# Patient Record
Sex: FEMALE | Race: WHITE | NOT HISPANIC OR LATINO | Employment: OTHER
[De-identification: names, ages, dates, MRNs, and addresses within clinical notes are randomized per-mention and may not be internally consistent; named-entity substitution may affect disease eponyms.]

---

## 2021-03-31 DIAGNOSIS — Z23 ENCOUNTER FOR IMMUNIZATION: ICD-10-CM

## 2023-05-24 ENCOUNTER — TRANSCRIBE ORDERS (OUTPATIENT)
Dept: SCHEDULING | Age: 68
End: 2023-05-24

## 2023-05-24 DIAGNOSIS — R39.15 URGENCY OF URINATION: Primary | ICD-10-CM

## 2023-08-02 ENCOUNTER — HOSPITAL ENCOUNTER (OUTPATIENT)
Dept: PHYSICAL THERAPY | Age: 68
Setting detail: THERAPIES SERIES
Discharge: HOME | End: 2023-08-02
Attending: NURSE PRACTITIONER
Payer: MEDICARE

## 2023-08-02 DIAGNOSIS — R39.15 URGENCY OF URINATION: Primary | ICD-10-CM

## 2023-08-02 PROCEDURE — 97530 THERAPEUTIC ACTIVITIES: CPT | Mod: GP

## 2023-08-02 RX ORDER — ROSUVASTATIN CALCIUM 5 MG/1
5 TABLET, COATED ORAL DAILY
COMMUNITY

## 2023-08-02 RX ORDER — LISINOPRIL AND HYDROCHLOROTHIAZIDE 10; 12.5 MG/1; MG/1
1 TABLET ORAL DAILY
COMMUNITY

## 2023-08-02 NOTE — LETTER
[address]  [facility] [place] [address]  [facility] Fax: [phone]    PHYSICAL THERAPY PLAN OF CARE    Patient Name: Mary Roque    Certification Dates:  From 23  To: 10/31/23  Frequency: 1 time/week Duration: 3 months  Other:      Provider: Ruba Morejon, PT     Referring Provider: Molly Talbot CRNP  PCP: Kati Wright MD        Payor: Payor: MEDICARE / Plan: MEDICARE PART A & B / Product Type: Medicare /   Medical Diagnosis: Urgency of urination [R39.15]     Rehab Potential: good, to achieve stated therapy goals    Planned Services: The patient's treatment will include CPT 16391 Gait training, CPT 63438 Manual therapy, CPT 89544 Neuromuscular Reeducation, CPT 97253 Therapeutic activities, CPT 57096 Therapeutic exercises, CPT 30027 Electrical stimulation ATTENDED, CPT 73771 Electrical stimulation UNATTENDED, CPT 59072 Hot/Cold Packs therapy, .     By signing this plan of care, I certify this plan of care as correct and necessary for the patient.        Physician Signature: _________________________________________ Date: _________________    Thank you for this referral. Please contact our department with any questions.      Ruba Morejon, GINETTE      Physical Therapy Evaluation    [facility] Fax: [phone]    PT EVALUATION FOR OUTPATIENT THERAPY    Patient: Mary Roque    MRN: [id number]  : 1955 68 y.o.     Referring Physician: Molly Talbot CRNP  Date of Visit: 2023      Certification Dates:  23 through 10/31/23         Recommended Frequency & Duration:  1 time/week for up to 3 months     Diagnosis:   1. Urgency of urination        Chief Complaints:   Chief Complaint   Patient presents with   • Pain   • Other     Bowel and bladder function       Precautions: no known precautions/restrictions  Precautions additional comments:      Past Medical History:   Past Medical History:   Diagnosis Date   • Hypertension    •  Lipid disorder        Past Surgical History: History reviewed. No pertinent surgical history.      LEARNING ASSESSMENT    Assessment completed:  Yes    Learner name:  Mary    Learner: Patient    Learning Barriers:  Learning barriers: No Barriers    Preferred Language: English     Needed: No    Education Provided:   Method: Discussion and Handout  Readiness: acceptance  Response: Needs reinforcement and Verbalizes understanding      CO-LEARNER ASSESSMENT:    Completed: No          Welcome letter discussed: Yes Patient provided with Welcome Letter, which includes attendance policy. Provided education regarding cancellation and no-show policy. Education regarding the importance of participation and regular attendance to maximize goal attainment.         OBJECTIVE MEASUREMENTS/DATA:    Time In Session:  Start Time: 0904  Stop Time: 1000  Time Calculation (min): 56 min   Assessment and Plan - 08/02/23 0906        Assessment    Plan of Care reviewed and patient/family in agreement Yes     Functional Limitations in Following Categories (PT Eval) self-care;home management;community/leisure     Rehab Potential/Prognosis good, to achieve stated therapy goals     Clinical Assessment Pt is a 68 y.o. F with complaints of bladder and bowel urgency. Extensive discussion on food and fluid choices to minimize strong urgency complaints. Education on small diet adjustments, and set initial goals for bowel health management. Pt instructed on deep breathing to relax PFM and fight/flight response with urgency, will build on this at next session. WIll complete pelvic eval at next session for greater understanding of symptoms.     Planned Services CPT 74637 Gait training;CPT 32192 Manual therapy;CPT 58513 Neuromuscular Reeducation;CPT 38439 Therapeutic activities;CPT 05902 Therapeutic exercises;CPT 83394 Electrical stimulation ATTENDED;CPT 67849 Electrical stimulation UNATTENDED;CPT 15070 Hot/Cold Packs therapy                 General Information - 08/02/23 0906        Session Details    Document Type initial evaluation     Mode of Treatment individual therapy        General Information    Referring Physician LATANYA Agee     Patient/Family/Caregiver Comments/Observations MedHx: 4 vaginal births, with tearing and x1 episiotomy     Existing Precautions/Restrictions no known precautions/restrictions                Pain/Vitals - 08/02/23 0906        Pain Assessment    Currently in pain No/Denies                Falls/Food Screening - 08/02/23 0906        Initial Falls Assessment    One or more falls in the last year Yes     How many times 1     Was the patient injured in any fall No   while hiking in the woods       Food Insecurity    Within the past 12 months, you worried that your food would run out before you got the money to buy more. Never true     Within the past 12 months, the food you bought just didn't last and you didn't have money to get more. Never true                PT - 08/02/23 0906        Physical Therapy    Physical Therapy Specialty Pelvic Floor Program: Age 14+        PT Plan    Frequency of treatment 1 time/week     PT Duration 3 months     PT Cert From 08/02/23     PT Cert To 10/31/23     Date PT POC was sent to provider 08/02/23     Signed PT Plan of Care received?  No                   Outcome Measures    PT Outcome Measures - 08/02/23 0906        Other Outcome Measures Used/Comments    Other outcome measure used: PFDI 50                  Goals        Patient Stated    •  <enter goal here> (pt-stated)        Other    •  Mutually agreed upon pain goal       Mutually agreed upon pain goal: n/a    PATIENT STATED: REDUCE BOWEL AND BLADDER URGENCY      •  Pelvic PT Goals       Pt will be I with initial HEP instruction to improve pt's bowel function and PFM tension: 3 wks    Pt will be I with x3 complete bowel emptying behavioral strategies for improving bowel health: 4 wks     Pt will successfully demonstrate  fecal urgency suppression strategies for 10 min, 90% of the time, for improved bowel health: 8 wks     Pt will be I with diaphragmatic breathing for reducing tension, stress, pelvic pain complaints: 4 wks    Pt will demonstrate good coordination (contract, relax, drop) of PFM, evident per eval, for improving coordinated defecation: 12 wks     Pt will report no UUI complaints greater than 1x every 2 wks, for good bowel health: 12 wks      Pt will report bladder freq WNL, day and night: 12 wks    Pt will be I with progression of PFM HEP instruction to maintain/improve bowel and pelvic health: 12 wks    PFDI score will improve to below 35, indicating improvements in bladder/bowel function and QOL (50 on IE): 12 wks               TREATMENT PLAN:    Pt is a 68 y.o. F with complaint of bowel urgency over the last couple years. Stopped drinking milk and eating less dairy but did not seem to help. In the past, pt was provided with slow and fast PFM contraction exercises that may have somewhat helped her bowel urgency, but then thought that more kegels may have stimulated more bladder problems.     Bladder: Comment:            Urination frequency About 7+ x/day   Urgency:  Bladder urgency may be related to need for a BM    Can have a strong bladder urge daily   Incontinence UUI not daily, a couple drops  Denies ZULLY   Pads 1 liner, maybe 2 with a leak   Nocturia 2x/night   Pain denies   Emptying WNL   Prolapse symptoms  Denies symptoms   Liquid consumption 1 mug coffee  Water WNL   UTI history 2x in last 2 years   Irritants          Bowel: Comment:        Frequency 3x/day   Urge Very strong moments of urgency daily   Incontinence Denies but worried about not making it in time   Emptying denies   [place] stool 4-6   Fiber    Management No red meat  No milk    Likes bread and bagels  2 servings fruits/veggies/day   Pain Denies  Hx of hemorrhoids    1 BM when awakes, 1 after coffee, 1-2 later in the day     OBGYN Comment:         Pregnancies 5   Births 4   Birth type vaginal   Tears/ episiotomies Episiotomy with 1st  Small tearing with others   Surgery denies   Menstruation menopause                   Sexual Activity Comment:    With partner   Type Minimally sexually active currently   Pain Denies pain or complaints with intimacy   Orgasm    Masturbation    Libido              Pain Comment:        denies                    Other Comment:        Physical activity  Hike 1x/wk  Walking more regularly   PLOF    Living environment    Other    Sleep              Systems Review:   Cord questions:  Pins and needles or tingling in both arms and both legs at the same time? denies  Problems with stumbling or falling? Denies     Cauda equina questions:  Problems with bowel and bladder control? Specifically retention? See above  Pins and needles or numbness in the saddle area? Denies     Review of systems:  General - (chills, night sweats, recent infection, fever, weight loss/gain, unexplained night pain, excessive fatigue): Denies  Do you have a history of cancer? Denies  Gastrointestinal system - (abdominal pain, bowel changes, nausea, vomiting, bloating): Denies  Cardiovascular system - (chest pain, palpitations, orthopnea, other): Denies  Respiratory system - (cough, SOB, sputum production, other): Denies  Musculoskeletal system: osteoporosis, vertebral fracture? Denies  Endocrine - (polyuria, polydipsia, heat or cold intolerance, other): Denies  Neurological - (numbness/tingling, falling/stumbling, HA, dizziness, diplopia, dysphagia/dysarthria, double vision, tinnitus, memory, drop attacks, other): Denies  Any long-term steroid use? Denies      Patient goals: reduce fecal urgency, reduce urinary urgency    OBJECTIVE FINDINGS:      ASSESSMENT   PELVIS/POSTURE            LUMBAR AROM    Flexion    Extension    rotation    Sidebending        HIP ROM    ER    IR    Flexion    Extension    Ankle ROM    Lower Extremity Strength    hip flexion    hip  extension    Hip IR    Hip ER    Hip abduction    Knee    Ankle            SPECIAL TESTS    Hamstring length    Hip FADIR    Hip SCOUR    Trendelenburg    Slump test    SLR test    CADE            LUMBAR PALPATION    HIP PALPATION    OTHER PALPATION        SI JOINT TESTING     S/L Compression test    Thigh thrust test    Prone sacral thrust test    Gaenslen test        BALANCE    GAIT MECHANICS    RUNNING MECHANICS    SQUATTING MECHANICS    LUNGING MECHANICS    BED MOBILITY    SIT TO STAND    FLOOR TO STAND      Verbal consent give for internal evaluation and treatment. yes    Pelvic Floor MMT and Function INITIAL EVALUATION 8/2/23 F/U ASSESSMENT   Consent to eval and treat yes    Assessment position Hooklying, draped with sheet     EXTERNAL PFM EXAM     PFM OBSERVATION     Skin integrity     Contract     Relax     Bulge/pelvic drop     Cough     Perineal descent     Vaginal wall laxity     Light touch sensation     INTERNAL Assessment performed Internal vaginal     Levator Ani PERF       Power *5     Endurance *seconds     Repetitions      Quick Flicks * x in 10 sec     Vaginal introitus     Nora-urethra     Levator palpation     Puborectalis      Obturator Internus      Piriformis      TAILBONE          ABDOMEN     MMT strength      Diastasis Recti      Pain      Breathing pattern      Hip flexors     Skin integumentary/incision lines           PFDI OUTCOME MEASURE 50 total; 8.3 POPDI, 12.5 CRAD, 29.2 SMOOTH     Adductors      Biofeedback                  TREATMENT LOG:  verbal consent for internal intervention: yes  Diagnosis  Bowel and bladder urgency    Precautions       PT INITIAL EVALUATION:   8/2/23     PT PROGRESS NOTE:        Y/N Treatment Details: Time:   MODALITIES  00003   0 mins   Heat       Ice       THER ACT          66154   20 mins   OUTCOME MEASURES Y PFDI    POC discussion y     Review of symptoms  Tests/Measures y     Falls Screen, Medication Review, VS, pain assessment Y      Education:  Y Pelvic  floor anatomy/ purpose function    *Pt verbalized understanding of education and returned demonstration appropriately*    Bladder Education y Healthy bladder norms  Bladder irritants  WNL fluid intake for bladder (pt likely WNL)  GOAL: 2-4 hours b/n bathroom trips    Bowel Education y Healthy bowel norms  Bowel irritants  Consistent meal times, consistent meal ingredients, night of rest and digest  GOAL: 1 serving fruit or veg with each meal for now  GOAL: delay for 15 min before BM when at home    Toilet Posture edu/demo/practice      Postural edu      HEP / HEP Review Y  eval: deep breathihg    THER EX         17096   0 mins   SUPINE THEREX:         SEATED THEREX:         STANDING THEREX:        STRETCHING:               NEURO RE-ED    80984   0 mins   Diaphragmatic breathing      Postural Re-Edu      Pelvic Coordination            Coordination with EMG Biofeedback  Pelvic floor muscle strengthening with [employer] biofeedback unit, external sensors placed at 9 and 3 o'clock of anal opening, grounding electrode placed on ischial tuberosity:   - SUPINE:   - SEATED:  - STANDING:      Biofeedback Objective Findings  SUPINE REST:  SUPINE WORK:  SEATED REST:  SEATED WORK:  STANDING REST:  STANDING WORK:    MANUAL                 21205   0 mins   Joint Mobilization       Deep Tissue mobilizations External      Deep Tissue Mobilization Internal      IASTM/MFR/TrP Release              PLAN:   - review all edu to date   - practice breathing   - abdomen and pelvic eval - check episiotomy            ASSESSMENT:    This 68 y.o. year old female presents to PT with above stated diagnosis. Physical Therapy evaluation reveals   resulting in self-care, home management, community/leisure limitations. Mary Roque will benefit from skilled PT services to address limitation, work towards rehab and patient goals and maximize PLOF of chosen ADLs.     Planned Services: The patient's treatment will include CPT 19256 Gait training,  CPT 34059 Manual therapy, CPT 59084 Neuromuscular Reeducation, CPT 59537 Therapeutic activities, CPT 92807 Therapeutic exercises, CPT 86343 Electrical stimulation ATTENDED, CPT 77129 Electrical stimulation UNATTENDED, CPT 32217 Hot/Cold Packs therapy, .     uRba Morejon, PT

## 2023-08-02 NOTE — Clinical Note
Dear DR. Talbot,    Thank you for this referral. Please review the attached notes and plan of care for your approval.  Please contact our department with any questions.     Sincerely,     Ruba Morejon, PT  [address]  [facility] [address]  Phone [phone]  Fax  [phone]    By co-signing this Plan of Care (POC) you agree to the following:  I have reviewed the the Plan of Care established by the therapist within this document and certify that the services are skilled and medically necessary. I have reviewed the plan and recommend that these services continue to meet the goals stated in this document.    PHYSICIAN SIGNATURE: __________________________________     DATE: ___________________  TIME: _____________           Physical Therapy Plan of Care 23   Effective from: 2023  Effective to: 10/31/2023    Plan ID: [id number]            Participants as of 2023    Name Type Comments Contact Info    LATANYA Costello Referring Provider  [phone]    Ruba Morejon, PT Physical Therapist         Last Progress Notes Note     Author: Ruba Morejon PT Status: Signed Last edited: 2023  8:00 AM       Physical Therapy Visit    PT DAILY NOTE FOR OUTPATIENT THERAPY    Patient: Mary Roque MRN: [id number]  : 1955 68 y.o.  Referring Physician: Molly Talbot CRNP  Date of Visit: 2023    Certification Dates: 23 through 10/31/23    Diagnosis:   1. Urgency of urination          Precautions:   Existing Precautions/Restrictions: no known precautions/restrictions      TODAY'S VISIT    Time In Session:  Start Time: 0800  Stop Time: 0858  Time Calculation (min): 58 min   History/Vitals/Pain/Encounter Info - 23 0801        Injury History/Precautions/Daily Required Info    Document Type daily treatment     Referring Physician LATANYA Agee     Existing Precautions/Restrictions no known precautions/restrictions      Patient/Family/Caregiver Comments/Observations Had a pain after a hike one time recently - heaviness, pressure, and pain. Also had this pelvic pain another instance. Does not occur regularly, a couple times in the past 6 months. Pt also realizes that she probably has closer to 5-6 BMs/day. Stool type can typically be 3-5 range, sometimes 1 and sometimes 6. Gets a bladder urge to void and may have a BM, too.     Patient reported fall since last visit No        Pain Assessment    Currently in pain No/Denies                Daily Treatment Assessment and Plan - 08/11/23 0801        Daily Treatment Assessment and Plan    Progress toward goals Progressing     Daily Outcome Summary Pelvic eval and focus on coordination of PFM with breathing. Pt able to demonstrate these skills with min cuing. Will progress PFM strength and control for fecal urgency suppression next session. Pt able to demo initial HEP for strengthening today. Also educated pt on toilet seat positioning and how to relax to have complete BM.                     OBJECTIVE DATA TAKEN TODAY:        Today's Treatment:    Pt is a 68 y.o. F with complaint of bowel urgency over the last couple years. Stopped drinking milk and eating less dairy but did not seem to help. In the past, pt was provided with slow and fast PFM contraction exercises that may have somewhat helped her bowel urgency, but then thought that more kegels may have stimulated more bladder problems.     Bladder: Comment:            Urination frequency About 7+ x/day   Urgency:  Bladder urgency may be related to need for a BM    Can have a strong bladder urge daily   Incontinence UUI not daily, a couple drops  Denies ZULLY   Pads 1 liner, maybe 2 with a leak   Nocturia 2x/night   Pain denies   Emptying WNL   Prolapse symptoms  Denies symptoms   Liquid consumption 1 mug coffee  Water WNL   UTI history 2x in last 2 years   Irritants          Bowel: Comment:        Frequency 3x/day   Urge Very strong  moments of urgency daily   Incontinence Denies but worried about not making it in time   Emptying denies   [place] stool 4-6   Fiber    Management No red meat  No milk    Likes bread and bagels  2 servings fruits/veggies/day   Pain Denies  Hx of hemorrhoids    1 BM when awakes, 1 after coffee, 1-2 later in the day     OBGYN Comment:        Pregnancies 5   Births 4   Birth type vaginal   Tears/ episiotomies Episiotomy with 1st  Small tearing with others   Surgery denies   Menstruation menopause                   Sexual Activity Comment:    With partner   Type Minimally sexually active currently   Pain Denies pain or complaints with intimacy   Orgasm    Masturbation    Libido              Pain Comment:        denies                    Other Comment:        Physical activity  Hike 1x/wk  Walking more regularly   PLOF    Living environment    Other    Sleep              Systems Review:   Cord questions:  Pins and needles or tingling in both arms and both legs at the same time? denies  Problems with stumbling or falling? Denies     Cauda equina questions:  Problems with bowel and bladder control? Specifically retention? See above  Pins and needles or numbness in the saddle area? Denies     Review of systems:  General - (chills, night sweats, recent infection, fever, weight loss/gain, unexplained night pain, excessive fatigue): Denies  Do you have a history of cancer? Denies  Gastrointestinal system - (abdominal pain, bowel changes, nausea, vomiting, bloating): Denies  Cardiovascular system - (chest pain, palpitations, orthopnea, other): Denies  Respiratory system - (cough, SOB, sputum production, other): Denies  Musculoskeletal system: osteoporosis, vertebral fracture? Denies  Endocrine - (polyuria, polydipsia, heat or cold intolerance, other): Denies  Neurological - (numbness/tingling, falling/stumbling, HA, dizziness, diplopia, dysphagia/dysarthria, double vision, tinnitus, memory, drop attacks, other): Denies  Any  long-term steroid use? Denies      Patient goals: reduce fecal urgency, reduce urinary urgency    OBJECTIVE FINDINGS:      ASSESSMENT   PELVIS/POSTURE            LUMBAR AROM    Flexion    Extension    rotation    Sidebending        HIP ROM    ER    IR    Flexion    Extension    Ankle ROM    Lower Extremity Strength    hip flexion    hip extension    Hip IR    Hip ER    Hip abduction    Knee    Ankle            SPECIAL TESTS    Hamstring length    Hip FADIR    Hip SCOUR    Trendelenburg    Slump test    SLR test    CADE            LUMBAR PALPATION    HIP PALPATION    OTHER PALPATION        SI JOINT TESTING     S/L Compression test    Thigh thrust test    Prone sacral thrust test    Gaenslen test        BALANCE    GAIT MECHANICS    RUNNING MECHANICS    SQUATTING MECHANICS    LUNGING MECHANICS    BED MOBILITY    SIT TO STAND    FLOOR TO STAND      Verbal consent give for internal evaluation and treatment. yes    Pelvic Floor MMT and Function INITIAL EVALUATION 8/2/23 F/U ASSESSMENT   Consent to eval and treat yes    Assessment position Hooklying, draped with sheet     EXTERNAL PFM EXAM No TTP B  Min-mod tension B    PFM OBSERVATION     Skin integrity Atrophy  Minor presence of labia minora  Skin redness  Large skin tag anal opening    Contract Present, able to minimize gluteal contraction    Relax present    Bulge/pelvic drop present    Cough absent    Perineal descent none    Vaginal wall laxity Anterior, not to level of hymen    Light touch sensation intact    INTERNAL Assessment performed Internal vaginal     Levator Ani PERF       Power 3/5     Endurance 3 seconds     Repetitions 5     Quick Flicks * x in 10 sec     Vaginal introitus WNL    Nora-urethra No TTP endopelvic fascia  L>R presence of contract at layer 2 muscles    Levator palpation Mod tension B  Able to relax with inhale  No TTP B    Puborectalis      Obturator Internus Neg TTP     Piriformis      TAILBONE          ABDOMEN     MMT strength       Diastasis Recti None present     Pain None present  No LLQ fullness present  No bladder urgency     Breathing pattern WNL     Hip flexors No TTP    Skin integumentary/incision lines WNL          PFDI OUTCOME MEASURE 50 total; 8.3 POPDI, 12.5 CRAD, 29.2 SMOOTH     Adductors      Biofeedback                  TREATMENT LOG:  verbal consent for internal intervention: yes  Diagnosis  Bowel and bladder urgency    Precautions       PT INITIAL EVALUATION:   8/2/23     PT PROGRESS NOTE:        Y/N Treatment Details: Time:   MODALITIES  61153   0 mins   Heat       Ice       THER ACT          21548   30 mins   OUTCOME MEASURES  PFDI    POC discussion y     Review of symptoms  Tests/Measures y     Falls Screen, Medication Review, VS, pain assessment Y      Education:  Y    y    y  y Pelvic floor anatomy/ purpose function    POP precautions: avoid pushing/straining; bend from knees/hips; no holding breath  Hemorrhoid edu  Vaginal atrophy edu: use of vaginal estrogen or coconut oil prn    *Pt verbalized understanding of education and returned demonstration appropriately*    Bladder Education  Healthy bladder norms  Bladder irritants  WNL fluid intake for bladder (pt likely WNL)  GOAL: 2-4 hours b/n bathroom trips    Bowel Education               y Healthy bowel norms  Bowel irritants  Consistent meal times, consistent meal ingredients, night of rest and digest  GOAL: 1 serving fruit or veg with each meal for now  GOAL: delay for 15 min before BM when at home    No greater than 15 min on the toilet; add deep breathing to relax    Toilet Posture edu/demo/practice y Toilet seat positioning, edu and rationale    Postural edu      HEP / HEP Review Y eval: deep breathing  8/11/23: toilet seat positioning; happy baby str, adductor str; 20x5 sec coordination PFMC with exhale    THER EX         49071   0 mins   SUPINE THEREX:         SEATED THEREX:         STANDING THEREX:        STRETCHING:               NEURO RE-ED    02111   25 mins    Diaphragmatic breathing y  y Practice in adductor butterfly  Practice in happy baby - grabbing feet    Postural Re-Edu      Pelvic Coordination y Pelvic eval: edu on findings   - pelvic coordination practice:      - inhale and relax practice      - exhale and contract x8          Coordination with EMG Biofeedback  Pelvic floor muscle strengthening with Prometheus biofeedback unit, external sensors placed at 9 and 3 o'clock of anal opening, grounding electrode placed on ischial tuberosity:   - SUPINE:   - SEATED:  - STANDING:      Biofeedback Objective Findings  SUPINE REST:  SUPINE WORK:  SEATED REST:  SEATED WORK:  STANDING REST:  STANDING WORK:    MANUAL                 99247   0 mins   Joint Mobilization       Deep Tissue mobilizations External      Deep Tissue Mobilization Internal      IASTM/MFR/TrP Release              PLAN:   - biofeedback next session   - review all edu to date   - practice breathing   - abdomen and pelvic eval - check episiotomy                                      Current Participants as of 8/11/2023    Name Type Comments Contact Info    LATANYA Costello Referring Provider  [phone]    Signature pending    Ruba Morejon PT Physical Therapist      Signature pending

## 2023-08-02 NOTE — PROGRESS NOTES
Physical Therapy Evaluation    [facility] Fax: [phone]    PT EVALUATION FOR OUTPATIENT THERAPY    Patient: Mary Roque    MRN: [id number]  : 1955 68 y.o.     Referring Physician: Molly Talbot CRNP  Date of Visit: 2023      Certification Dates:  23 through 10/31/23         Recommended Frequency & Duration:  1 time/week for up to 3 months     Diagnosis:   1. Urgency of urination        Chief Complaints:   Chief Complaint   Patient presents with   • Pain   • Other     Bowel and bladder function       Precautions: no known precautions/restrictions  Precautions additional comments:      Past Medical History:   Past Medical History:   Diagnosis Date   • Hypertension    • Lipid disorder        Past Surgical History: History reviewed. No pertinent surgical history.      LEARNING ASSESSMENT    Assessment completed:  Yes    Learner name:  Mary    Learner: Patient    Learning Barriers:  Learning barriers: No Barriers    Preferred Language: English     Needed: No    Education Provided:   Method: Discussion and Handout  Readiness: acceptance  Response: Needs reinforcement and Verbalizes understanding      CO-LEARNER ASSESSMENT:    Completed: No          Welcome letter discussed: Yes Patient provided with Welcome Letter, which includes attendance policy. Provided education regarding cancellation and no-show policy. Education regarding the importance of participation and regular attendance to maximize goal attainment.         OBJECTIVE MEASUREMENTS/DATA:    Time In Session:  Start Time: 904  Stop Time: 1000  Time Calculation (min): 56 min   Assessment and Plan - 23 0906        Assessment    Plan of Care reviewed and patient/family in agreement Yes     Functional Limitations in Following Categories (PT Eval) self-care;home management;community/leisure     Rehab Potential/Prognosis good, to achieve stated therapy goals     Clinical Assessment Pt is a 68 y.o. F with  complaints of bladder and bowel urgency. Extensive discussion on food and fluid choices to minimize strong urgency complaints. Education on small diet adjustments, and set initial goals for bowel health management. Pt instructed on deep breathing to relax PFM and fight/flight response with urgency, will build on this at next session. WIll complete pelvic eval at next session for greater understanding of symptoms.     Planned Services CPT 68922 Gait training;CPT 58728 Manual therapy;CPT 96181 Neuromuscular Reeducation;CPT 20049 Therapeutic activities;CPT 36036 Therapeutic exercises;CPT 45103 Electrical stimulation ATTENDED;CPT 33423 Electrical stimulation UNATTENDED;CPT 81323 Hot/Cold Packs therapy                General Information - 08/02/23 0906        Session Details    Document Type initial evaluation     Mode of Treatment individual therapy        General Information    Referring Physician LATANYA Agee     Patient/Family/Caregiver Comments/Observations MedHx: 4 vaginal births, with tearing and x1 episiotomy     Existing Precautions/Restrictions no known precautions/restrictions                Pain/Vitals - 08/02/23 0906        Pain Assessment    Currently in pain No/Denies                Falls/Food Screening - 08/02/23 0906        Initial Falls Assessment    One or more falls in the last year Yes     How many times 1     Was the patient injured in any fall No   while hiking in the woods       Food Insecurity    Within the past 12 months, you worried that your food would run out before you got the money to buy more. Never true     Within the past 12 months, the food you bought just didn't last and you didn't have money to get more. Never true                PT - 08/02/23 0906        Physical Therapy    Physical Therapy Specialty Pelvic Floor Program: Age 14+        PT Plan    Frequency of treatment 1 time/week     PT Duration 3 months     PT Cert From 08/02/23     PT Cert To 10/31/23     Date PT POC was  sent to provider 08/02/23     Signed PT Plan of Care received?  No                   Outcome Measures    PT Outcome Measures - 08/02/23 0906        Other Outcome Measures Used/Comments    Other outcome measure used: PFDI 50                  Goals        Patient Stated    •  <enter goal here> (pt-stated)        Other    •  Mutually agreed upon pain goal       Mutually agreed upon pain goal: n/a    PATIENT STATED: REDUCE BOWEL AND BLADDER URGENCY      •  Pelvic PT Goals       Pt will be I with initial HEP instruction to improve pt's bowel function and PFM tension: 3 wks    Pt will be I with x3 complete bowel emptying behavioral strategies for improving bowel health: 4 wks     Pt will successfully demonstrate fecal urgency suppression strategies for 10 min, 90% of the time, for improved bowel health: 8 wks     Pt will be I with diaphragmatic breathing for reducing tension, stress, pelvic pain complaints: 4 wks    Pt will demonstrate good coordination (contract, relax, drop) of PFM, evident per eval, for improving coordinated defecation: 12 wks     Pt will report no UUI complaints greater than 1x every 2 wks, for good bowel health: 12 wks      Pt will report bladder freq WNL, day and night: 12 wks    Pt will be I with progression of PFM HEP instruction to maintain/improve bowel and pelvic health: 12 wks    PFDI score will improve to below 35, indicating improvements in bladder/bowel function and QOL (50 on IE): 12 wks               TREATMENT PLAN:    Pt is a 68 y.o. F with complaint of bowel urgency over the last couple years. Stopped drinking milk and eating less dairy but did not seem to help. In the past, pt was provided with slow and fast PFM contraction exercises that may have somewhat helped her bowel urgency, but then thought that more kegels may have stimulated more bladder problems.     Bladder: Comment:            Urination frequency About 7+ x/day   Urgency:  Bladder urgency may be related to need for a  BM    Can have a strong bladder urge daily   Incontinence UUI not daily, a couple drops  Denies ZULLY   Pads 1 liner, maybe 2 with a leak   Nocturia 2x/night   Pain denies   Emptying WNL   Prolapse symptoms  Denies symptoms   Liquid consumption 1 mug coffee  Water WNL   UTI history 2x in last 2 years   Irritants          Bowel: Comment:        Frequency 3x/day   Urge Very strong moments of urgency daily   Incontinence Denies but worried about not making it in time   Emptying denies   [place] stool 4-6   Fiber    Management No red meat  No milk    Likes bread and bagels  2 servings fruits/veggies/day   Pain Denies  Hx of hemorrhoids    1 BM when awakes, 1 after coffee, 1-2 later in the day     OBGYN Comment:        Pregnancies 5   Births 4   Birth type vaginal   Tears/ episiotomies Episiotomy with 1st  Small tearing with others   Surgery denies   Menstruation menopause                   Sexual Activity Comment:    With partner   Type Minimally sexually active currently   Pain Denies pain or complaints with intimacy   Orgasm    Masturbation    Libido              Pain Comment:        denies                    Other Comment:        Physical activity  Hike 1x/wk  Walking more regularly   PLOF    Living environment    Other    Sleep              Systems Review:   Cord questions:  Pins and needles or tingling in both arms and both legs at the same time? denies  Problems with stumbling or falling? Denies     Cauda equina questions:  Problems with bowel and bladder control? Specifically retention? See above  Pins and needles or numbness in the saddle area? Denies     Review of systems:  General - (chills, night sweats, recent infection, fever, weight loss/gain, unexplained night pain, excessive fatigue): Denies  Do you have a history of cancer? Denies  Gastrointestinal system - (abdominal pain, bowel changes, nausea, vomiting, bloating): Denies  Cardiovascular system - (chest pain, palpitations, orthopnea, other):  Denies  Respiratory system - (cough, SOB, sputum production, other): Denies  Musculoskeletal system: osteoporosis, vertebral fracture? Denies  Endocrine - (polyuria, polydipsia, heat or cold intolerance, other): Denies  Neurological - (numbness/tingling, falling/stumbling, HA, dizziness, diplopia, dysphagia/dysarthria, double vision, tinnitus, memory, drop attacks, other): Denies  Any long-term steroid use? Denies      Patient goals: reduce fecal urgency, reduce urinary urgency    OBJECTIVE FINDINGS:      ASSESSMENT   PELVIS/POSTURE            LUMBAR AROM    Flexion    Extension    rotation    Sidebending        HIP ROM    ER    IR    Flexion    Extension    Ankle ROM    Lower Extremity Strength    hip flexion    hip extension    Hip IR    Hip ER    Hip abduction    Knee    Ankle            SPECIAL TESTS    Hamstring length    Hip FADIR    Hip SCOUR    Trendelenburg    Slump test    SLR test    CADE            LUMBAR PALPATION    HIP PALPATION    OTHER PALPATION        SI JOINT TESTING     S/L Compression test    Thigh thrust test    Prone sacral thrust test    Gaenslen test        BALANCE    GAIT MECHANICS    RUNNING MECHANICS    SQUATTING MECHANICS    LUNGING MECHANICS    BED MOBILITY    SIT TO STAND    FLOOR TO STAND      Verbal consent give for internal evaluation and treatment. yes    Pelvic Floor MMT and Function INITIAL EVALUATION 8/2/23 F/U ASSESSMENT   Consent to eval and treat yes    Assessment position Hooklying, draped with sheet     EXTERNAL PFM EXAM     PFM OBSERVATION     Skin integrity     Contract     Relax     Bulge/pelvic drop     Cough     Perineal descent     Vaginal wall laxity     Light touch sensation     INTERNAL Assessment performed Internal vaginal     Levator Ani PERF       Power *5     Endurance *seconds     Repetitions      Quick Flicks * x in 10 sec     Vaginal introitus     Nora-urethra     Levator palpation     Puborectalis      Obturator Internus      Piriformis      TAILBONE           ABDOMEN     MMT strength      Diastasis Recti      Pain      Breathing pattern      Hip flexors     Skin integumentary/incision lines           PFDI OUTCOME MEASURE 50 total; 8.3 POPDI, 12.5 CRAD, 29.2 SMOOTH     Adductors      Biofeedback                  TREATMENT LOG:  verbal consent for internal intervention: yes  Diagnosis  Bowel and bladder urgency    Precautions       PT INITIAL EVALUATION:   8/2/23     PT PROGRESS NOTE:        Y/N Treatment Details: Time:   MODALITIES  47767   0 mins   Heat       Ice       THER ACT          73913   20 mins   OUTCOME MEASURES Y PFDI    POC discussion y     Review of symptoms  Tests/Measures y     Falls Screen, Medication Review, VS, pain assessment Y      Education:  Y Pelvic floor anatomy/ purpose function    *Pt verbalized understanding of education and returned demonstration appropriately*    Bladder Education y Healthy bladder norms  Bladder irritants  WNL fluid intake for bladder (pt likely WNL)  GOAL: 2-4 hours b/n bathroom trips    Bowel Education y Healthy bowel norms  Bowel irritants  Consistent meal times, consistent meal ingredients, night of rest and digest  GOAL: 1 serving fruit or veg with each meal for now  GOAL: delay for 15 min before BM when at home    Toilet Posture edu/demo/practice      Postural edu      HEP / HEP Review Y  eval: deep breathihg    THER EX         74975   0 mins   SUPINE THEREX:         SEATED THEREX:         STANDING THEREX:        STRETCHING:               NEURO RE-ED    36688   0 mins   Diaphragmatic breathing      Postural Re-Edu      Pelvic Coordination            Coordination with EMG Biofeedback  Pelvic floor muscle strengthening with Prometheus biofeedback unit, external sensors placed at 9 and 3 o'clock of anal opening, grounding electrode placed on ischial tuberosity:   - SUPINE:   - SEATED:  - STANDING:      Biofeedback Objective Findings  SUPINE REST:  SUPINE WORK:  SEATED REST:  SEATED WORK:  STANDING REST:  STANDING WORK:     MANUAL                 95499   0 mins   Joint Mobilization       Deep Tissue mobilizations External      Deep Tissue Mobilization Internal      IASTM/MFR/TrP Release              PLAN:   - review all edu to date   - practice breathing   - abdomen and pelvic eval - check episiotomy            ASSESSMENT:    This 68 y.o. year old female presents to PT with above stated diagnosis. Physical Therapy evaluation reveals   resulting in self-care, home management, community/leisure limitations. Mary Roque will benefit from skilled PT services to address limitation, work towards rehab and patient goals and maximize PLOF of chosen ADLs.     Planned Services: The patient's treatment will include CPT 53686 Gait training, CPT 89575 Manual therapy, CPT 23609 Neuromuscular Reeducation, CPT 64598 Therapeutic activities, CPT 64580 Therapeutic exercises, CPT 08378 Electrical stimulation ATTENDED, CPT 24911 Electrical stimulation UNATTENDED, CPT 15691 Hot/Cold Packs therapy, .     Ruba Morejon, PT

## 2023-08-02 NOTE — OP PT TREATMENT LOG
Pt is a 68 y.o. F with complaint of bowel urgency over the last couple years. Stopped drinking milk and eating less dairy but did not seem to help. In the past, pt was provided with slow and fast PFM contraction exercises that may have somewhat helped her bowel urgency, but then thought that more kegels may have stimulated more bladder problems.     Bladder: Comment:            Urination frequency About 7+ x/day   Urgency:  Bladder urgency may be related to need for a BM    Can have a strong bladder urge daily   Incontinence UUI not daily, a couple drops  Denies ZULLY   Pads 1 liner, maybe 2 with a leak   Nocturia 2x/night   Pain denies   Emptying WNL   Prolapse symptoms  Denies symptoms   Liquid consumption 1 mug coffee  Water WNL   UTI history 2x in last 2 years   Irritants          Bowel: Comment:        Frequency 3x/day   Urge Very strong moments of urgency daily   Incontinence Denies but worried about not making it in time   Emptying denies   [place] stool 4-6   Fiber    Management No red meat  No milk    Likes bread and bagels  2 servings fruits/veggies/day   Pain Denies  Hx of hemorrhoids    1 BM when awakes, 1 after coffee, 1-2 later in the day     OBGYN Comment:        Pregnancies 5   Births 4   Birth type vaginal   Tears/ episiotomies Episiotomy with 1st  Small tearing with others   Surgery denies   Menstruation menopause                   Sexual Activity Comment:    With partner   Type Minimally sexually active currently   Pain Denies pain or complaints with intimacy   Orgasm    Masturbation    Libido              Pain Comment:        denies                    Other Comment:        Physical activity  Hike 1x/wk  Walking more regularly   PLOF    Living environment    Other    Sleep              Systems Review:   Cord questions:  Pins and needles or tingling in both arms and both legs at the same time? denies  Problems with stumbling or falling? Denies     Cauda equina questions:  Problems with bowel and  bladder control? Specifically retention? See above  Pins and needles or numbness in the saddle area? Denies     Review of systems:  General - (chills, night sweats, recent infection, fever, weight loss/gain, unexplained night pain, excessive fatigue): Denies  Do you have a history of cancer? Denies  Gastrointestinal system - (abdominal pain, bowel changes, nausea, vomiting, bloating): Denies  Cardiovascular system - (chest pain, palpitations, orthopnea, other): Denies  Respiratory system - (cough, SOB, sputum production, other): Denies  Musculoskeletal system: osteoporosis, vertebral fracture? Denies  Endocrine - (polyuria, polydipsia, heat or cold intolerance, other): Denies  Neurological - (numbness/tingling, falling/stumbling, HA, dizziness, diplopia, dysphagia/dysarthria, double vision, tinnitus, memory, drop attacks, other): Denies  Any long-term steroid use? Denies      Patient goals: reduce fecal urgency, reduce urinary urgency    OBJECTIVE FINDINGS:      ASSESSMENT   PELVIS/POSTURE            LUMBAR AROM    Flexion    Extension    rotation    Sidebending        HIP ROM    ER    IR    Flexion    Extension    Ankle ROM    Lower Extremity Strength    hip flexion    hip extension    Hip IR    Hip ER    Hip abduction    Knee    Ankle            SPECIAL TESTS    Hamstring length    Hip FADIR    Hip SCOUR    Trendelenburg    Slump test    SLR test    CADE            LUMBAR PALPATION    HIP PALPATION    OTHER PALPATION        SI JOINT TESTING     S/L Compression test    Thigh thrust test    Prone sacral thrust test    Gaenslen test        BALANCE    GAIT MECHANICS    RUNNING MECHANICS    SQUATTING MECHANICS    LUNGING MECHANICS    BED MOBILITY    SIT TO STAND    FLOOR TO STAND      Verbal consent give for internal evaluation and treatment. yes    Pelvic Floor MMT and Function INITIAL EVALUATION 8/2/23 F/U ASSESSMENT   Consent to eval and treat yes    Assessment position Hooklying, draped with sheet     EXTERNAL PFM  EXAM     PFM OBSERVATION     Skin integrity     Contract     Relax     Bulge/pelvic drop     Cough     Perineal descent     Vaginal wall laxity     Light touch sensation     INTERNAL Assessment performed Internal vaginal     Levator Ani PERF       Power *5     Endurance *seconds     Repetitions      Quick Flicks * x in 10 sec     Vaginal introitus     Nora-urethra     Levator palpation     Puborectalis      Obturator Internus      Piriformis      TAILBONE          ABDOMEN     MMT strength      Diastasis Recti      Pain      Breathing pattern      Hip flexors     Skin integumentary/incision lines           PFDI OUTCOME MEASURE 50 total; 8.3 POPDI, 12.5 CRAD, 29.2 SMOOTH     Adductors      Biofeedback                  TREATMENT LOG:  verbal consent for internal intervention: yes  Diagnosis  Bowel and bladder urgency    Precautions       PT INITIAL EVALUATION:   8/2/23     PT PROGRESS NOTE:        Y/N Treatment Details: Time:   MODALITIES  10116   0 mins   Heat       Ice       THER ACT          70506   20 mins   OUTCOME MEASURES Y PFDI    POC discussion y     Review of symptoms  Tests/Measures y     Falls Screen, Medication Review, VS, pain assessment Y      Education:  Y Pelvic floor anatomy/ purpose function    *Pt verbalized understanding of education and returned demonstration appropriately*    Bladder Education y Healthy bladder norms  Bladder irritants  WNL fluid intake for bladder (pt likely WNL)  GOAL: 2-4 hours b/n bathroom trips    Bowel Education y Healthy bowel norms  Bowel irritants  Consistent meal times, consistent meal ingredients, night of rest and digest  GOAL: 1 serving fruit or veg with each meal for now  GOAL: delay for 15 min before BM when at home    Toilet Posture edu/demo/practice      Postural edu      HEP / HEP Review Y  eval: deep breathihg    THER EX         33150   0 mins   SUPINE THEREX:         SEATED THEREX:         STANDING THEREX:        STRETCHING:               NEURO RE-ED    06160   0  mins   Diaphragmatic breathing      Postural Re-Edu      Pelvic Coordination            Coordination with EMG Biofeedback  Pelvic floor muscle strengthening with [employer] biofeedback unit, external sensors placed at 9 and 3 o'clock of anal opening, grounding electrode placed on ischial tuberosity:   - SUPINE:   - SEATED:  - STANDING:      Biofeedback Objective Findings  SUPINE REST:  SUPINE WORK:  SEATED REST:  SEATED WORK:  STANDING REST:  STANDING WORK:    MANUAL                 50035   0 mins   Joint Mobilization       Deep Tissue mobilizations External      Deep Tissue Mobilization Internal      IASTM/MFR/TrP Release              PLAN:   - review all edu to date   - practice breathing   - abdomen and pelvic eval - check episiotomy

## 2023-08-11 ENCOUNTER — HOSPITAL ENCOUNTER (OUTPATIENT)
Dept: PHYSICAL THERAPY | Age: 68
Setting detail: THERAPIES SERIES
Discharge: HOME | End: 2023-08-11
Attending: NURSE PRACTITIONER
Payer: MEDICARE

## 2023-08-11 DIAGNOSIS — R39.15 URGENCY OF URINATION: Primary | ICD-10-CM

## 2023-08-11 PROCEDURE — 97530 THERAPEUTIC ACTIVITIES: CPT | Mod: GP

## 2023-08-11 PROCEDURE — 97112 NEUROMUSCULAR REEDUCATION: CPT | Mod: GP

## 2023-08-11 NOTE — OP PT TREATMENT LOG
Pt is a 68 y.o. F with complaint of bowel urgency over the last couple years. Stopped drinking milk and eating less dairy but did not seem to help. In the past, pt was provided with slow and fast PFM contraction exercises that may have somewhat helped her bowel urgency, but then thought that more kegels may have stimulated more bladder problems.     Bladder: Comment:            Urination frequency About 7+ x/day   Urgency:  Bladder urgency may be related to need for a BM    Can have a strong bladder urge daily   Incontinence UUI not daily, a couple drops  Denies ZULLY   Pads 1 liner, maybe 2 with a leak   Nocturia 2x/night   Pain denies   Emptying WNL   Prolapse symptoms  Denies symptoms   Liquid consumption 1 mug coffee  Water WNL   UTI history 2x in last 2 years   Irritants          Bowel: Comment:        Frequency 3x/day   Urge Very strong moments of urgency daily   Incontinence Denies but worried about not making it in time   Emptying denies   [place] stool 4-6   Fiber    Management No red meat  No milk    Likes bread and bagels  2 servings fruits/veggies/day   Pain Denies  Hx of hemorrhoids    1 BM when awakes, 1 after coffee, 1-2 later in the day     OBGYN Comment:        Pregnancies 5   Births 4   Birth type vaginal   Tears/ episiotomies Episiotomy with 1st  Small tearing with others   Surgery denies   Menstruation menopause                   Sexual Activity Comment:    With partner   Type Minimally sexually active currently   Pain Denies pain or complaints with intimacy   Orgasm    Masturbation    Libido              Pain Comment:        denies                    Other Comment:        Physical activity  Hike 1x/wk  Walking more regularly   PLOF    Living environment    Other    Sleep              Systems Review:   Cord questions:  Pins and needles or tingling in both arms and both legs at the same time? denies  Problems with stumbling or falling? Denies     Cauda equina questions:  Problems with bowel and  bladder control? Specifically retention? See above  Pins and needles or numbness in the saddle area? Denies     Review of systems:  General - (chills, night sweats, recent infection, fever, weight loss/gain, unexplained night pain, excessive fatigue): Denies  Do you have a history of cancer? Denies  Gastrointestinal system - (abdominal pain, bowel changes, nausea, vomiting, bloating): Denies  Cardiovascular system - (chest pain, palpitations, orthopnea, other): Denies  Respiratory system - (cough, SOB, sputum production, other): Denies  Musculoskeletal system: osteoporosis, vertebral fracture? Denies  Endocrine - (polyuria, polydipsia, heat or cold intolerance, other): Denies  Neurological - (numbness/tingling, falling/stumbling, HA, dizziness, diplopia, dysphagia/dysarthria, double vision, tinnitus, memory, drop attacks, other): Denies  Any long-term steroid use? Denies      Patient goals: reduce fecal urgency, reduce urinary urgency    OBJECTIVE FINDINGS:      ASSESSMENT   PELVIS/POSTURE            LUMBAR AROM    Flexion    Extension    rotation    Sidebending        HIP ROM    ER    IR    Flexion    Extension    Ankle ROM    Lower Extremity Strength    hip flexion    hip extension    Hip IR    Hip ER    Hip abduction    Knee    Ankle            SPECIAL TESTS    Hamstring length    Hip FADIR    Hip SCOUR    Trendelenburg    Slump test    SLR test    CADE            LUMBAR PALPATION    HIP PALPATION    OTHER PALPATION        SI JOINT TESTING     S/L Compression test    Thigh thrust test    Prone sacral thrust test    Gaenslen test        BALANCE    GAIT MECHANICS    RUNNING MECHANICS    SQUATTING MECHANICS    LUNGING MECHANICS    BED MOBILITY    SIT TO STAND    FLOOR TO STAND      Verbal consent give for internal evaluation and treatment. yes    Pelvic Floor MMT and Function INITIAL EVALUATION 8/2/23 F/U ASSESSMENT   Consent to eval and treat yes    Assessment position Hooklying, draped with sheet     EXTERNAL PFM  EXAM No TTP B  Min-mod tension B    PFM OBSERVATION     Skin integrity Atrophy  Minor presence of labia minora  Skin redness  Large skin tag anal opening    Contract Present, able to minimize gluteal contraction    Relax present    Bulge/pelvic drop present    Cough absent    Perineal descent none    Vaginal wall laxity Anterior, not to level of hymen    Light touch sensation intact    INTERNAL Assessment performed Internal vaginal     Levator Ani PERF       Power 3/5     Endurance 3 seconds     Repetitions 5     Quick Flicks * x in 10 sec     Vaginal introitus WNL    Nora-urethra No TTP endopelvic fascia  L>R presence of contract at layer 2 muscles    Levator palpation Mod tension B  Able to relax with inhale  No TTP B    Puborectalis      Obturator Internus Neg TTP     Piriformis      TAILBONE          ABDOMEN     MMT strength      Diastasis Recti None present     Pain None present  No LLQ fullness present  No bladder urgency     Breathing pattern WNL     Hip flexors No TTP    Skin integumentary/incision lines WNL          PFDI OUTCOME MEASURE 50 total; 8.3 POPDI, 12.5 CRAD, 29.2 SMOOTH     Adductors      Biofeedback                  TREATMENT LOG:  verbal consent for internal intervention: yes  Diagnosis  Bowel and bladder urgency    Precautions       PT INITIAL EVALUATION:   8/2/23     PT PROGRESS NOTE:        Y/N Treatment Details: Time:   MODALITIES  88151   0 mins   Heat       Ice       THER ACT          02466   30 mins   OUTCOME MEASURES  PFDI    POC discussion y     Review of symptoms  Tests/Measures y     Falls Screen, Medication Review, VS, pain assessment Y      Education:  Y    y    y  y Pelvic floor anatomy/ purpose function    POP precautions: avoid pushing/straining; bend from knees/hips; no holding breath  Hemorrhoid edu  Vaginal atrophy edu: use of vaginal estrogen or coconut oil prn    *Pt verbalized understanding of education and returned demonstration appropriately*    Bladder Education  Healthy  bladder norms  Bladder irritants  WNL fluid intake for bladder (pt likely WNL)  GOAL: 2-4 hours b/n bathroom trips    Bowel Education               y Healthy bowel norms  Bowel irritants  Consistent meal times, consistent meal ingredients, night of rest and digest  GOAL: 1 serving fruit or veg with each meal for now  GOAL: delay for 15 min before BM when at home    No greater than 15 min on the toilet; add deep breathing to relax    Toilet Posture edu/demo/practice y Toilet seat positioning, edu and rationale    Postural edu      HEP / HEP Review Y eval: deep breathing  8/11/23: toilet seat positioning; happy baby str, adductor str; 20x5 sec coordination PFMC with exhale    THER EX         88947   0 mins   SUPINE THEREX:         SEATED THEREX:         STANDING THEREX:        STRETCHING:               NEURO RE-ED    88347   25 mins   Diaphragmatic breathing y  y Practice in adductor butterfly  Practice in happy baby - grabbing feet    Postural Re-Edu      Pelvic Coordination y Pelvic eval: edu on findings   - pelvic coordination practice:      - inhale and relax practice      - exhale and contract x8          Coordination with EMG Biofeedback  Pelvic floor muscle strengthening with [employer] biofeedback unit, external sensors placed at 9 and 3 o'clock of anal opening, grounding electrode placed on ischial tuberosity:   - SUPINE:   - SEATED:  - STANDING:      Biofeedback Objective Findings  SUPINE REST:  SUPINE WORK:  SEATED REST:  SEATED WORK:  STANDING REST:  STANDING WORK:    MANUAL                 13077   0 mins   Joint Mobilization       Deep Tissue mobilizations External      Deep Tissue Mobilization Internal      IASTM/MFR/TrP Release              PLAN:   - biofeedback next session   - review all edu to date   - practice breathing   - abdomen and pelvic eval - check episiotomy

## 2023-08-11 NOTE — PROGRESS NOTES
Physical Therapy Visit    PT DAILY NOTE FOR OUTPATIENT THERAPY    Patient: Mary Roque MRN: [id number]  : 1955 68 y.o.  Referring Physician: Molly Talbot CRNP  Date of Visit: 2023    Certification Dates: 23 through 10/31/23    Diagnosis:   1. Urgency of urination          Precautions:   Existing Precautions/Restrictions: no known precautions/restrictions      TODAY'S VISIT    Time In Session:  Start Time: 0800  Stop Time: 0858  Time Calculation (min): 58 min   History/Vitals/Pain/Encounter Info - 23        Injury History/Precautions/Daily Required Info    Document Type daily treatment     Referring Physician LATANYA Agee     Existing Precautions/Restrictions no known precautions/restrictions     Patient/Family/Caregiver Comments/Observations Had a pain after a hike one time recently - heaviness, pressure, and pain. Also had this pelvic pain another instance. Does not occur regularly, a couple times in the past 6 months. Pt also realizes that she probably has closer to 5-6 BMs/day. Stool type can typically be 3-5 range, sometimes 1 and sometimes 6. Gets a bladder urge to void and may have a BM, too.     Patient reported fall since last visit No        Pain Assessment    Currently in pain No/Denies                Daily Treatment Assessment and Plan - 23        Daily Treatment Assessment and Plan    Progress toward goals Progressing     Daily Outcome Summary Pelvic eval and focus on coordination of PFM with breathing. Pt able to demonstrate these skills with min cuing. Will progress PFM strength and control for fecal urgency suppression next session. Pt able to demo initial HEP for strengthening today. Also educated pt on toilet seat positioning and how to relax to have complete BM.                     OBJECTIVE DATA TAKEN TODAY:        Today's Treatment:    Pt is a 68 y.o. F with complaint of bowel urgency over the last couple years. Stopped drinking milk  and eating less dairy but did not seem to help. In the past, pt was provided with slow and fast PFM contraction exercises that may have somewhat helped her bowel urgency, but then thought that more kegels may have stimulated more bladder problems.     Bladder: Comment:            Urination frequency About 7+ x/day   Urgency:  Bladder urgency may be related to need for a BM    Can have a strong bladder urge daily   Incontinence UUI not daily, a couple drops  Denies ZULLY   Pads 1 liner, maybe 2 with a leak   Nocturia 2x/night   Pain denies   Emptying WNL   Prolapse symptoms  Denies symptoms   Liquid consumption 1 mug coffee  Water WNL   UTI history 2x in last 2 years   Irritants          Bowel: Comment:        Frequency 3x/day   Urge Very strong moments of urgency daily   Incontinence Denies but worried about not making it in time   Emptying denies   [place] stool 4-6   Fiber    Management No red meat  No milk    Likes bread and bagels  2 servings fruits/veggies/day   Pain Denies  Hx of hemorrhoids    1 BM when awakes, 1 after coffee, 1-2 later in the day     OBGYN Comment:        Pregnancies 5   Births 4   Birth type vaginal   Tears/ episiotomies Episiotomy with 1st  Small tearing with others   Surgery denies   Menstruation menopause                   Sexual Activity Comment:    With partner   Type Minimally sexually active currently   Pain Denies pain or complaints with intimacy   Orgasm    Masturbation    Libido              Pain Comment:        denies                    Other Comment:        Physical activity  Hike 1x/wk  Walking more regularly   PLOF    Living environment    Other    Sleep              Systems Review:   Cord questions:  Pins and needles or tingling in both arms and both legs at the same time? denies  Problems with stumbling or falling? Denies     Cauda equina questions:  Problems with bowel and bladder control? Specifically retention? See above  Pins and needles or numbness in the saddle area?  Denies     Review of systems:  General - (chills, night sweats, recent infection, fever, weight loss/gain, unexplained night pain, excessive fatigue): Denies  Do you have a history of cancer? Denies  Gastrointestinal system - (abdominal pain, bowel changes, nausea, vomiting, bloating): Denies  Cardiovascular system - (chest pain, palpitations, orthopnea, other): Denies  Respiratory system - (cough, SOB, sputum production, other): Denies  Musculoskeletal system: osteoporosis, vertebral fracture? Denies  Endocrine - (polyuria, polydipsia, heat or cold intolerance, other): Denies  Neurological - (numbness/tingling, falling/stumbling, HA, dizziness, diplopia, dysphagia/dysarthria, double vision, tinnitus, memory, drop attacks, other): Denies  Any long-term steroid use? Denies      Patient goals: reduce fecal urgency, reduce urinary urgency    OBJECTIVE FINDINGS:      ASSESSMENT   PELVIS/POSTURE            LUMBAR AROM    Flexion    Extension    rotation    Sidebending        HIP ROM    ER    IR    Flexion    Extension    Ankle ROM    Lower Extremity Strength    hip flexion    hip extension    Hip IR    Hip ER    Hip abduction    Knee    Ankle            SPECIAL TESTS    Hamstring length    Hip FADIR    Hip SCOUR    Trendelenburg    Slump test    SLR test    CADE            LUMBAR PALPATION    HIP PALPATION    OTHER PALPATION        SI JOINT TESTING     S/L Compression test    Thigh thrust test    Prone sacral thrust test    Gaenslen test        BALANCE    GAIT MECHANICS    RUNNING MECHANICS    SQUATTING MECHANICS    LUNGING MECHANICS    BED MOBILITY    SIT TO STAND    FLOOR TO STAND      Verbal consent give for internal evaluation and treatment. yes    Pelvic Floor MMT and Function INITIAL EVALUATION 8/2/23 F/U ASSESSMENT   Consent to eval and treat yes    Assessment position Hooklying, draped with sheet     EXTERNAL PFM EXAM No TTP B  Min-mod tension B    PFM OBSERVATION     Skin integrity Atrophy  Minor presence of  labia minora  Skin redness  Large skin tag anal opening    Contract Present, able to minimize gluteal contraction    Relax present    Bulge/pelvic drop present    Cough absent    Perineal descent none    Vaginal wall laxity Anterior, not to level of hymen    Light touch sensation intact    INTERNAL Assessment performed Internal vaginal     Levator Ani PERF       Power 3/5     Endurance 3 seconds     Repetitions 5     Quick Flicks * x in 10 sec     Vaginal introitus WNL    Nora-urethra No TTP endopelvic fascia  L>R presence of contract at layer 2 muscles    Levator palpation Mod tension B  Able to relax with inhale  No TTP B    Puborectalis      Obturator Internus Neg TTP     Piriformis      TAILBONE          ABDOMEN     MMT strength      Diastasis Recti None present     Pain None present  No LLQ fullness present  No bladder urgency     Breathing pattern WNL     Hip flexors No TTP    Skin integumentary/incision lines WNL          PFDI OUTCOME MEASURE 50 total; 8.3 POPDI, 12.5 CRAD, 29.2 SMOOTH     Adductors      Biofeedback                  TREATMENT LOG:  verbal consent for internal intervention: yes  Diagnosis  Bowel and bladder urgency    Precautions       PT INITIAL EVALUATION:   8/2/23     PT PROGRESS NOTE:        Y/N Treatment Details: Time:   MODALITIES  65224   0 mins   Heat       Ice       THER ACT          22059   30 mins   OUTCOME MEASURES  PFDI    POC discussion y     Review of symptoms  Tests/Measures y     Falls Screen, Medication Review, VS, pain assessment Y      Education:  Y    y    y  y Pelvic floor anatomy/ purpose function    POP precautions: avoid pushing/straining; bend from knees/hips; no holding breath  Hemorrhoid edu  Vaginal atrophy edu: use of vaginal estrogen or coconut oil prn    *Pt verbalized understanding of education and returned demonstration appropriately*    Bladder Education  Healthy bladder norms  Bladder irritants  WNL fluid intake for bladder (pt likely WNL)  GOAL: 2-4 hours b/n  bathroom trips    Bowel Education               y Healthy bowel norms  Bowel irritants  Consistent meal times, consistent meal ingredients, night of rest and digest  GOAL: 1 serving fruit or veg with each meal for now  GOAL: delay for 15 min before BM when at home    No greater than 15 min on the toilet; add deep breathing to relax    Toilet Posture edu/demo/practice y Toilet seat positioning, edu and rationale    Postural edu      HEP / HEP Review Y eval: deep breathing  8/11/23: toilet seat positioning; happy baby str, adductor str; 20x5 sec coordination PFMC with exhale    THER EX         55902   0 mins   SUPINE THEREX:         SEATED THEREX:         STANDING THEREX:        STRETCHING:               NEURO RE-ED    62926   25 mins   Diaphragmatic breathing y  y Practice in adductor butterfly  Practice in happy baby - grabbing feet    Postural Re-Edu      Pelvic Coordination y Pelvic eval: edu on findings   - pelvic coordination practice:      - inhale and relax practice      - exhale and contract x8          Coordination with EMG Biofeedback  Pelvic floor muscle strengthening with [employer] biofeedback unit, external sensors placed at 9 and 3 o'clock of anal opening, grounding electrode placed on ischial tuberosity:   - SUPINE:   - SEATED:  - STANDING:      Biofeedback Objective Findings  SUPINE REST:  SUPINE WORK:  SEATED REST:  SEATED WORK:  STANDING REST:  STANDING WORK:    MANUAL                 53391   0 mins   Joint Mobilization       Deep Tissue mobilizations External      Deep Tissue Mobilization Internal      IASTM/MFR/TrP Release              PLAN:   - biofeedback next session   - review all edu to date   - practice breathing   - abdomen and pelvic eval - check episiotomy

## 2023-08-16 ENCOUNTER — HOSPITAL ENCOUNTER (OUTPATIENT)
Dept: PHYSICAL THERAPY | Age: 68
Setting detail: THERAPIES SERIES
Discharge: HOME | End: 2023-08-16
Attending: NURSE PRACTITIONER
Payer: MEDICARE

## 2023-08-16 DIAGNOSIS — R39.15 URGENCY OF URINATION: Primary | ICD-10-CM

## 2023-08-16 PROCEDURE — 97112 NEUROMUSCULAR REEDUCATION: CPT | Mod: GP

## 2023-08-16 PROCEDURE — 97530 THERAPEUTIC ACTIVITIES: CPT | Mod: GP

## 2023-08-16 NOTE — PROGRESS NOTES
Physical Therapy Visit    PT DAILY NOTE FOR OUTPATIENT THERAPY    Patient: Mary Roque MRN: [id number]  : 1955 68 y.o.  Referring Physician: Molly Talbot CRNP  Date of Visit: 2023    Certification Dates: 23 through 10/31/23    Diagnosis:   1. Urgency of urination               Precautions:   Existing Precautions/Restrictions: no known precautions/restrictions      TODAY'S VISIT    Time In Session:  Start Time: 08  Stop Time: 857  Time Calculation (min): 55 min   History/Vitals/Pain/Encounter Info - 23        Injury History/Precautions/Daily Required Info    Document Type daily treatment     Referring Physician LATANYA Agee     Existing Precautions/Restrictions no known precautions/restrictions     Patient/Family/Caregiver Comments/Observations No new reports this week. No questions from last session. Verbalizing fatigue when completing about 20 PFMC at once. Often small BMs associated with the need to urinate.     Patient reported fall since last visit No        Pain Assessment    Currently in pain No/Denies                Daily Treatment Assessment and Plan - 23 08        Daily Treatment Assessment and Plan    Progress toward goals Progressing     Daily Outcome Summary PFM coordination practice with visual aid of biofeedback. Pt has moments of higher resting tone and moments of slow to relax between pelvic contractions, likely contributing to incomplete emptying and mroe freq BMs. RAIR reflex education and discussion for urgency suppression, pt able to demonstrate sustained PFM contraction with mod effort today.                       Today's Treatment:    Pt is a 68 y.o. F with complaint of bowel urgency over the last couple years. Stopped drinking milk and eating less dairy but did not seem to help. In the past, pt was provided with slow and fast PFM contraction exercises that may have somewhat helped her bowel urgency, but then thought that more  kegels may have stimulated more bladder problems.     Bladder: Comment:            Urination frequency About 7+ x/day   Urgency:  Bladder urgency may be related to need for a BM    Can have a strong bladder urge daily   Incontinence UUI not daily, a couple drops  Denies ZULLY   Pads 1 liner, maybe 2 with a leak   Nocturia 2x/night   Pain denies   Emptying WNL   Prolapse symptoms  Denies symptoms   Liquid consumption 1 mug coffee  Water WNL   UTI history 2x in last 2 years   Irritants          Bowel: Comment:        Frequency 3x/day   Urge Very strong moments of urgency daily   Incontinence Denies but worried about not making it in time   Emptying denies   [place] stool 4-6   Fiber    Management No red meat  No milk    Likes bread and bagels  2 servings fruits/veggies/day   Pain Denies  Hx of hemorrhoids    1 BM when awakes, 1 after coffee, 1-2 later in the day     OBGYN Comment:        Pregnancies 5   Births 4   Birth type vaginal   Tears/ episiotomies Episiotomy with 1st  Small tearing with others   Surgery denies   Menstruation menopause                   Sexual Activity Comment:    With partner   Type Minimally sexually active currently   Pain Denies pain or complaints with intimacy   Orgasm    Masturbation    Libido              Pain Comment:        denies                    Other Comment:        Physical activity  Hike 1x/wk  Walking more regularly   PLOF    Living environment    Other    Sleep              Systems Review:   Cord questions:  Pins and needles or tingling in both arms and both legs at the same time? denies  Problems with stumbling or falling? Denies     Cauda equina questions:  Problems with bowel and bladder control? Specifically retention? See above  Pins and needles or numbness in the saddle area? Denies     Review of systems:  General - (chills, night sweats, recent infection, fever, weight loss/gain, unexplained night pain, excessive fatigue): Denies  Do you have a history of cancer?  Denies  Gastrointestinal system - (abdominal pain, bowel changes, nausea, vomiting, bloating): Denies  Cardiovascular system - (chest pain, palpitations, orthopnea, other): Denies  Respiratory system - (cough, SOB, sputum production, other): Denies  Musculoskeletal system: osteoporosis, vertebral fracture? Denies  Endocrine - (polyuria, polydipsia, heat or cold intolerance, other): Denies  Neurological - (numbness/tingling, falling/stumbling, HA, dizziness, diplopia, dysphagia/dysarthria, double vision, tinnitus, memory, drop attacks, other): Denies  Any long-term steroid use? Denies      Patient goals: reduce fecal urgency, reduce urinary urgency    OBJECTIVE FINDINGS:      ASSESSMENT   PELVIS/POSTURE            LUMBAR AROM    Flexion    Extension    rotation    Sidebending        HIP ROM    ER    IR    Flexion    Extension    Ankle ROM    Lower Extremity Strength    hip flexion    hip extension    Hip IR    Hip ER    Hip abduction    Knee    Ankle            SPECIAL TESTS    Hamstring length    Hip FADIR    Hip SCOUR    Trendelenburg    Slump test    SLR test    CADE            LUMBAR PALPATION    HIP PALPATION    OTHER PALPATION        SI JOINT TESTING     S/L Compression test    Thigh thrust test    Prone sacral thrust test    Gaenslen test        BALANCE    GAIT MECHANICS    RUNNING MECHANICS    SQUATTING MECHANICS    LUNGING MECHANICS    BED MOBILITY    SIT TO STAND    FLOOR TO STAND      Verbal consent give for internal evaluation and treatment. yes    Pelvic Floor MMT and Function INITIAL EVALUATION 8/2/23 F/U ASSESSMENT   Consent to eval and treat yes    Assessment position Hooklying, draped with sheet     EXTERNAL PFM EXAM No TTP B  Min-mod tension B    PFM OBSERVATION     Skin integrity Atrophy  Minor presence of labia minora  Skin redness  Large skin tag anal opening    Contract Present, able to minimize gluteal contraction    Relax present    Bulge/pelvic drop present    Cough absent    Perineal  descent none    Vaginal wall laxity Anterior, not to level of hymen    Light touch sensation intact    INTERNAL Assessment performed Internal vaginal     Levator Ani PERF       Power 3/5     Endurance 3 seconds     Repetitions 5     Quick Flicks * x in 10 sec     Vaginal introitus WNL    Nora-urethra No TTP endopelvic fascia  L>R presence of contract at layer 2 muscles    Levator palpation Mod tension B  Able to relax with inhale  No TTP B    Puborectalis      Obturator Internus Neg TTP     Piriformis      TAILBONE          ABDOMEN     MMT strength      Diastasis Recti None present     Pain None present  No LLQ fullness present  No bladder urgency     Breathing pattern WNL     Hip flexors No TTP    Skin integumentary/incision lines WNL          PFDI OUTCOME MEASURE 50 total; 8.3 POPDI, 12.5 CRAD, 29.2 SMOOTH     Adductors      Biofeedback                  TREATMENT LOG:  verbal consent for internal intervention: yes  Diagnosis  Bowel and bladder urgency    Precautions       PT INITIAL EVALUATION:   8/2/23     PT PROGRESS NOTE:        Y/N Treatment Details: Time:   MODALITIES  32934   0 mins   Heat       Ice       THER ACT          22688   10 mins   OUTCOME MEASURES  PFDI    POC discussion y     Review of symptoms  Tests/Measures y     Falls Screen, Medication Review, VS, pain assessment Y      Education:  Y    h    h  h Pelvic floor anatomy/ purpose function    POP precautions: avoid pushing/straining; bend from knees/hips; no holding breath  Hemorrhoid edu  Vaginal atrophy edu: use of vaginal estrogen or coconut oil prn    *Pt verbalized understanding of education and returned demonstration appropriately*    Bladder Education  Healthy bladder norms  Bladder irritants  WNL fluid intake for bladder (pt likely WNL)  GOAL: 2-4 hours b/n bathroom trips    Bowel Education                   y Healthy bowel norms  Bowel irritants  Consistent meal times, consistent meal ingredients, night of rest and digest  GOAL: 1 serving  fruit or veg with each meal for now  GOAL: delay for 15 min before BM when at home    No greater than 15 min on the toilet; add deep breathing to relax  FECAL urge suppression: RAIR reflex, 40 sec hold; deep breathing, mental distraction    Toilet Posture edu/demo/practice y Toilet seat positioning, edu and rationale    Postural edu      HEP / HEP Review Y eval: deep breathing  8/11/23: toilet seat positioning; happy baby str, adductor str; 20x5 sec coordination PFMC with exhale    THER EX         56321   0 mins   SUPINE THEREX:         SEATED THEREX:         STANDING THEREX:        STRETCHING:               NEURO RE-ED    [id number]   45 mins   Diaphragmatic breathing    Practice in adductor butterfly  Practice in happy baby - grabbing feet    Postural Re-Edu      Pelvic Coordination  Pelvic eval: edu on findings   - pelvic coordination practice:      - inhale and relax practice      - exhale and contract x8          Coordination with EMG Biofeedback y Pelvic floor muscle strengthening with [employer] biofeedback unit, external sensors placed at 9 and 3 o'clock of anal opening, grounding electrode placed on ischial tuberosity:   - SUPINE:     - quick 10x     - slow 10x5 sec *slow to relax initially   - SEATED:     - quick 10x     - slow 5x5 sec      - slow 5x10 sec     - 50%>100% x5     - 100%>50%     - 1x40 sec  - STANDING:     - quick 10x     - slow 5x5 sec      - slow 5x10 sec     - 50%>100% x5     - 100%>50%     - 1x40 sec      Biofeedback Objective Findings y SUPINE REST: 2-3 uV range  SUPINE WORK: 15 uV for 5-10 seconds  SEATED REST: 5>0-1 uV range, with practice  SEATED WORK: 15-20 uV for 10 sec  STANDING REST: 5>3 uV range  STANDING WORK: 15-20 uV for 10 sec plateau    *40 sec attempt 15>10 uV reduction in plateau  *slow to relax 50-75% of the time    MANUAL                 [id number]   0 mins   Joint Mobilization       Deep Tissue mobilizations External      Deep Tissue Mobilization Internal      IASTM/MFR/TrP  Release              PLAN:   - TE coordination next   - check bowel fullness again   - biofeedback - skip 1-2   - review all edu to date   - practice breathing

## 2023-08-16 NOTE — OP PT TREATMENT LOG
Pt is a 68 y.o. F with complaint of bowel urgency over the last couple years. Stopped drinking milk and eating less dairy but did not seem to help. In the past, pt was provided with slow and fast PFM contraction exercises that may have somewhat helped her bowel urgency, but then thought that more kegels may have stimulated more bladder problems.     Bladder: Comment:            Urination frequency About 7+ x/day   Urgency:  Bladder urgency may be related to need for a BM    Can have a strong bladder urge daily   Incontinence UUI not daily, a couple drops  Denies ZULLY   Pads 1 liner, maybe 2 with a leak   Nocturia 2x/night   Pain denies   Emptying WNL   Prolapse symptoms  Denies symptoms   Liquid consumption 1 mug coffee  Water WNL   UTI history 2x in last 2 years   Irritants          Bowel: Comment:        Frequency 3x/day   Urge Very strong moments of urgency daily   Incontinence Denies but worried about not making it in time   Emptying denies   [place] stool 4-6   Fiber    Management No red meat  No milk    Likes bread and bagels  2 servings fruits/veggies/day   Pain Denies  Hx of hemorrhoids    1 BM when awakes, 1 after coffee, 1-2 later in the day     OBGYN Comment:        Pregnancies 5   Births 4   Birth type vaginal   Tears/ episiotomies Episiotomy with 1st  Small tearing with others   Surgery denies   Menstruation menopause                   Sexual Activity Comment:    With partner   Type Minimally sexually active currently   Pain Denies pain or complaints with intimacy   Orgasm    Masturbation    Libido              Pain Comment:        denies                    Other Comment:        Physical activity  Hike 1x/wk  Walking more regularly   PLOF    Living environment    Other    Sleep              Systems Review:   Cord questions:  Pins and needles or tingling in both arms and both legs at the same time? denies  Problems with stumbling or falling? Denies     Cauda equina questions:  Problems with bowel and  bladder control? Specifically retention? See above  Pins and needles or numbness in the saddle area? Denies     Review of systems:  General - (chills, night sweats, recent infection, fever, weight loss/gain, unexplained night pain, excessive fatigue): Denies  Do you have a history of cancer? Denies  Gastrointestinal system - (abdominal pain, bowel changes, nausea, vomiting, bloating): Denies  Cardiovascular system - (chest pain, palpitations, orthopnea, other): Denies  Respiratory system - (cough, SOB, sputum production, other): Denies  Musculoskeletal system: osteoporosis, vertebral fracture? Denies  Endocrine - (polyuria, polydipsia, heat or cold intolerance, other): Denies  Neurological - (numbness/tingling, falling/stumbling, HA, dizziness, diplopia, dysphagia/dysarthria, double vision, tinnitus, memory, drop attacks, other): Denies  Any long-term steroid use? Denies      Patient goals: reduce fecal urgency, reduce urinary urgency    OBJECTIVE FINDINGS:      ASSESSMENT   PELVIS/POSTURE            LUMBAR AROM    Flexion    Extension    rotation    Sidebending        HIP ROM    ER    IR    Flexion    Extension    Ankle ROM    Lower Extremity Strength    hip flexion    hip extension    Hip IR    Hip ER    Hip abduction    Knee    Ankle            SPECIAL TESTS    Hamstring length    Hip FADIR    Hip SCOUR    Trendelenburg    Slump test    SLR test    CADE            LUMBAR PALPATION    HIP PALPATION    OTHER PALPATION        SI JOINT TESTING     S/L Compression test    Thigh thrust test    Prone sacral thrust test    Gaenslen test        BALANCE    GAIT MECHANICS    RUNNING MECHANICS    SQUATTING MECHANICS    LUNGING MECHANICS    BED MOBILITY    SIT TO STAND    FLOOR TO STAND      Verbal consent give for internal evaluation and treatment. yes    Pelvic Floor MMT and Function INITIAL EVALUATION 8/2/23 F/U ASSESSMENT   Consent to eval and treat yes    Assessment position Hooklying, draped with sheet     EXTERNAL PFM  EXAM No TTP B  Min-mod tension B    PFM OBSERVATION     Skin integrity Atrophy  Minor presence of labia minora  Skin redness  Large skin tag anal opening    Contract Present, able to minimize gluteal contraction    Relax present    Bulge/pelvic drop present    Cough absent    Perineal descent none    Vaginal wall laxity Anterior, not to level of hymen    Light touch sensation intact    INTERNAL Assessment performed Internal vaginal     Levator Ani PERF       Power 3/5     Endurance 3 seconds     Repetitions 5     Quick Flicks * x in 10 sec     Vaginal introitus WNL    Nora-urethra No TTP endopelvic fascia  L>R presence of contract at layer 2 muscles    Levator palpation Mod tension B  Able to relax with inhale  No TTP B    Puborectalis      Obturator Internus Neg TTP     Piriformis      TAILBONE          ABDOMEN     MMT strength      Diastasis Recti None present     Pain None present  No LLQ fullness present  No bladder urgency     Breathing pattern WNL     Hip flexors No TTP    Skin integumentary/incision lines WNL          PFDI OUTCOME MEASURE 50 total; 8.3 POPDI, 12.5 CRAD, 29.2 SMOOTH     Adductors      Biofeedback                  TREATMENT LOG:  verbal consent for internal intervention: yes  Diagnosis  Bowel and bladder urgency    Precautions       PT INITIAL EVALUATION:   8/2/23     PT PROGRESS NOTE:        Y/N Treatment Details: Time:   MODALITIES  40458   0 mins   Heat       Ice       THER ACT          [id number]   10 mins   OUTCOME MEASURES  PFDI    POC discussion y     Review of symptoms  Tests/Measures y     Falls Screen, Medication Review, VS, pain assessment Y      Education:  Y    h    h  h Pelvic floor anatomy/ purpose function    POP precautions: avoid pushing/straining; bend from knees/hips; no holding breath  Hemorrhoid edu  Vaginal atrophy edu: use of vaginal estrogen or coconut oil prn    *Pt verbalized understanding of education and returned demonstration appropriately*    Bladder Education  Healthy  bladder norms  Bladder irritants  WNL fluid intake for bladder (pt likely WNL)  GOAL: 2-4 hours b/n bathroom trips    Bowel Education                   y Healthy bowel norms  Bowel irritants  Consistent meal times, consistent meal ingredients, night of rest and digest  GOAL: 1 serving fruit or veg with each meal for now  GOAL: delay for 15 min before BM when at home    No greater than 15 min on the toilet; add deep breathing to relax  FECAL urge suppression: RAIR reflex, 40 sec hold; deep breathing, mental distraction    Toilet Posture edu/demo/practice y Toilet seat positioning, edu and rationale    Postural edu      HEP / HEP Review Y eval: deep breathing  8/11/23: toilet seat positioning; happy baby str, adductor str; 20x5 sec coordination PFMC with exhale    THER EX         [id number]   0 mins   SUPINE THEREX:         SEATED THEREX:         STANDING THEREX:        STRETCHING:               NEURO RE-ED    [id number]   45 mins   Diaphragmatic breathing    Practice in adductor butterfly  Practice in happy baby - grabbing feet    Postural Re-Edu      Pelvic Coordination  Pelvic eval: edu on findings   - pelvic coordination practice:      - inhale and relax practice      - exhale and contract x8          Coordination with EMG Biofeedback y Pelvic floor muscle strengthening with [employer] biofeedback unit, external sensors placed at 9 and 3 o'clock of anal opening, grounding electrode placed on ischial tuberosity:   - SUPINE:     - quick 10x     - slow 10x5 sec *slow to relax initially   - SEATED:     - quick 10x     - slow 5x5 sec      - slow 5x10 sec     - 50%>100% x5     - 100%>50%     - 1x40 sec  - STANDING:     - quick 10x     - slow 5x5 sec      - slow 5x10 sec     - 50%>100% x5     - 100%>50%     - 1x40 sec      Biofeedback Objective Findings y SUPINE REST: 2-3 uV range  SUPINE WORK: 15 uV for 5-10 seconds  SEATED REST: 5>0-1 uV range, with practice  SEATED WORK: 15-20 uV for 10 sec  STANDING REST: 5>3 uV  range  STANDING WORK: 15-20 uV for 10 sec plateau    *40 sec attempt 15>10 uV reduction in plateau  *slow to relax 50-75% of the time    MANUAL                 31430   0 mins   Joint Mobilization       Deep Tissue mobilizations External      Deep Tissue Mobilization Internal      IASTM/MFR/TrP Release              PLAN:   - TE coordination next   - check bowel fullness again   - biofeedback - skip 1-2   - review all edu to date   - practice breathing

## 2023-08-31 ENCOUNTER — HOSPITAL ENCOUNTER (OUTPATIENT)
Dept: PHYSICAL THERAPY | Age: 68
Setting detail: THERAPIES SERIES
Discharge: HOME | End: 2023-08-31
Attending: NURSE PRACTITIONER
Payer: MEDICARE

## 2023-08-31 DIAGNOSIS — R39.15 URGENCY OF URINATION: Primary | ICD-10-CM

## 2023-08-31 PROCEDURE — 97110 THERAPEUTIC EXERCISES: CPT | Mod: GP

## 2023-08-31 PROCEDURE — 97530 THERAPEUTIC ACTIVITIES: CPT | Mod: GP

## 2023-09-08 ENCOUNTER — HOSPITAL ENCOUNTER (OUTPATIENT)
Dept: PHYSICAL THERAPY | Age: 68
Setting detail: THERAPIES SERIES
Discharge: HOME | End: 2023-09-08
Attending: NURSE PRACTITIONER
Payer: MEDICARE

## 2023-09-08 DIAGNOSIS — R39.15 URGENCY OF URINATION: Primary | ICD-10-CM

## 2023-09-08 PROCEDURE — 97530 THERAPEUTIC ACTIVITIES: CPT | Mod: GP

## 2023-09-08 PROCEDURE — 97110 THERAPEUTIC EXERCISES: CPT | Mod: GP

## 2023-09-08 NOTE — PROGRESS NOTES
Physical Therapy Visit    PT DAILY NOTE FOR OUTPATIENT THERAPY    Patient: Mary Roque MRN: [id number]  : 1955 68 y.o.  Referring Physician: Molly Talbot CRNP  Date of Visit: 2023    Certification Dates: 23 through 10/31/23    Diagnosis:   1. Urgency of urination            TODAY'S VISIT    Time In Session:  Start Time: 0806  Stop Time: 0900  Time Calculation (min): 54 min   History/Vitals/Pain/Encounter Info - 23 0807        Injury History/Precautions/Daily Required Info    Document Type daily treatment     Patient/Family/Caregiver Comments/Observations Urgent BM in the giant grocery store that was difficult to control, tried control strategies that may not have helped in the moment. 5 BMs/day, can be urgent, can be small pieces, usually formed but can be loose. Had f/u with Molly Talbot: suggest fiber supplement     Patient reported fall since last visit No        Pain Assessment    Currently in pain No/Denies                Daily Treatment Assessment and Plan - 23 08        Daily Treatment Assessment and Plan    Progress toward goals Progressing     Daily Outcome Summary Practice of fecal urgency suppression strategies in multiple positions with additional facilitation strategies. Further explanation of fecal urgency suppression strategies today, and encourage pt to try to suppression fecal urges 1x/day for 10-15 minutes when in the comfort of own home. WIll assess response at [place].                       Today's Treatment:    Pt is a 68 y.o. F with complaint of bowel urgency over the last couple years. Stopped drinking milk and eating less dairy but did not seem to help. In the past, pt was provided with slow and fast PFM contraction exercises that may have somewhat helped her bowel urgency, but then thought that more kegels may have stimulated more bladder problems.     Bladder: Comment:            Urination frequency About 7+ x/day   Urgency:  Bladder urgency may be  related to need for a BM    Can have a strong bladder urge daily   Incontinence UUI not daily, a couple drops  Denies ZULLY   Pads 1 liner, maybe 2 with a leak   Nocturia 2x/night   Pain denies   Emptying WNL   Prolapse symptoms  Denies symptoms   Liquid consumption 1 mug coffee  Water WNL   UTI history 2x in last 2 years   Irritants          Bowel: Comment:        Frequency 3x/day   Urge Very strong moments of urgency daily   Incontinence Denies but worried about not making it in time   Emptying denies   [place] stool 4-6   Fiber    Management No red meat  No milk    Likes bread and bagels  2 servings fruits/veggies/day   Pain Denies  Hx of hemorrhoids    1 BM when awakes, 1 after coffee, 1-2 later in the day     OBGYN Comment:        Pregnancies 5   Births 4   Birth type vaginal   Tears/ episiotomies Episiotomy with 1st  Small tearing with others   Surgery denies   Menstruation menopause                   Sexual Activity Comment:    With partner   Type Minimally sexually active currently   Pain Denies pain or complaints with intimacy   Orgasm    Masturbation    Libido              Pain Comment:        denies                    Other Comment:        Physical activity  Hike 1x/wk  Walking more regularly   PLOF    Living environment    Other    Sleep              Systems Review:   Cord questions:  Pins and needles or tingling in both arms and both legs at the same time? denies  Problems with stumbling or falling? Denies     Cauda equina questions:  Problems with bowel and bladder control? Specifically retention? See above  Pins and needles or numbness in the saddle area? Denies     Review of systems:  General - (chills, night sweats, recent infection, fever, weight loss/gain, unexplained night pain, excessive fatigue): Denies  Do you have a history of cancer? Denies  Gastrointestinal system - (abdominal pain, bowel changes, nausea, vomiting, bloating): Denies  Cardiovascular system - (chest pain, palpitations,  orthopnea, other): Denies  Respiratory system - (cough, SOB, sputum production, other): Denies  Musculoskeletal system: osteoporosis, vertebral fracture? Denies  Endocrine - (polyuria, polydipsia, heat or cold intolerance, other): Denies  Neurological - (numbness/tingling, falling/stumbling, HA, dizziness, diplopia, dysphagia/dysarthria, double vision, tinnitus, memory, drop attacks, other): Denies  Any long-term steroid use? Denies      Patient goals: reduce fecal urgency, reduce urinary urgency    OBJECTIVE FINDINGS:      ASSESSMENT   PELVIS/POSTURE            LUMBAR AROM    Flexion    Extension    rotation    Sidebending        HIP ROM    ER    IR    Flexion    Extension    Ankle ROM    Lower Extremity Strength    hip flexion    hip extension    Hip IR    Hip ER    Hip abduction    Knee    Ankle            SPECIAL TESTS    Hamstring length    Hip FADIR    Hip SCOUR    Trendelenburg    Slump test    SLR test    CADE            LUMBAR PALPATION    HIP PALPATION    OTHER PALPATION        SI JOINT TESTING     S/L Compression test    Thigh thrust test    Prone sacral thrust test    Gaenslen test        BALANCE    GAIT MECHANICS    RUNNING MECHANICS    SQUATTING MECHANICS    LUNGING MECHANICS    BED MOBILITY    SIT TO STAND    FLOOR TO STAND      Verbal consent give for internal evaluation and treatment. yes    Pelvic Floor MMT and Function INITIAL EVALUATION 8/2/23 F/U ASSESSMENT 8/31/23   Consent to eval and treat yes    Assessment position Hooklying, draped with sheet     EXTERNAL PFM EXAM No TTP B  Min-mod tension B    PFM OBSERVATION     Skin integrity Atrophy  Minor presence of labia minora  Skin redness  Large skin tag anal opening    Contract Present, able to minimize gluteal contraction    Relax present    Bulge/pelvic drop present    Cough absent    Perineal descent none    Vaginal wall laxity Anterior, not to level of hymen    Light touch sensation intact    INTERNAL Assessment performed Internal vaginal      Levator Ani PERF       Power 3/5     Endurance 3 seconds     Repetitions 5     Quick Flicks * x in 10 sec     Vaginal introitus WNL    Nora-urethra No TTP endopelvic fascia  L>R presence of contract at layer 2 muscles    Levator palpation Mod tension B  Able to relax with inhale  No TTP B    Puborectalis      Obturator Internus Neg TTP     Piriformis      TAILBONE          ABDOMEN     MMT strength  Improved TAC and breath coordination  WNL    Diastasis Recti None present     Pain None present  No LLQ fullness present  No bladder urgency     Breathing pattern WNL     Hip flexors No TTP    Skin integumentary/incision lines WNL          PFDI OUTCOME MEASURE 50 total; 8.3 POPDI, 12.5 CRAD, 29.2 SMOOTH 53.1 total; 0 POPDI, 28.1 CRAD, 25 SMOOTH   Adductors      Biofeedback                  TREATMENT LOG:  verbal consent for internal intervention: yes  Diagnosis  Bowel and bladder urgency    Precautions       PT INITIAL EVALUATION:   8/2/23     PT PROGRESS NOTE:        Y/N Treatment Details: Time:   MODALITIES  21246   0 mins   Heat       Ice       THER ACT          [id number]   24 mins   OUTCOME MEASURES  PFDI    POC discussion y     Review of symptoms  Tests/Measures y     Falls Screen, Medication Review, VS, pain assessment Y      Education:  Y    h    h  h Pelvic floor anatomy/ purpose function    POP precautions: avoid pushing/straining; bend from knees/hips; no holding breath  Hemorrhoid edu  Vaginal atrophy edu: use of vaginal estrogen or coconut oil prn    *Pt verbalized understanding of education and returned demonstration appropriately*    Bladder Education  Healthy bladder norms  Bladder irritants  WNL fluid intake for bladder (pt likely WNL)  GOAL: 2-4 hours b/n bathroom trips    Bowel Education         y  y          y      y Healthy bowel norms  Bowel irritants  Consistent meal times, consistent meal ingredients, night of rest and digest  GOAL: 1 serving fruit or veg with each meal for now  Fiber: may consider small  dose of fiber supplement and assess response over the course of the week    No greater than 15 min on the toilet; add deep breathing to relax  FECAL urge suppression: RAIR reflex, 40 sec hold; deep breathing, mental distraction; cont to practice prn when away from home (grocery store)  - practice RAIR perhaps after breakfast, delay urge for 10-15 min then use BR    Toilet Posture edu/demo/practice y Toilet seat positioning, edu and rationale    Postural edu      HEP / HEP Review Y eval: deep breathing  8/11/23: toilet seat positioning; happy baby str, adductor str; 20x5 sec coordination PFMC with exhale  8/31/23: hip abd, hip add, bridge, wall squat, plank at counter, row    THER EX         [id number]   30 mins   SUPINE THEREX:   h  h  h  h TAC/PFMC in isolation 15x at pace of breath  TAC with ADD, 5 sec hold  TAC with hip ABD gtb, 5 sec hold  TAC with bridge, gtb 5sec hold    *all with breath coordination    SEATED THEREX:         STANDING THEREX:   h  h  y  y    y - shoulder row, gtb  - wall squat 2 sets to fatigue - just breathing  - counter plank 2 sets to fatigue - just breathing  - wall plank at elbows 1 set - just breathing    RAIR practice: 40 sec holds   - shoulder mid row   - shoulder low row   [facility]      STRETCHING:               NEURO RE-ED    [id number]   0 mins   Diaphragmatic breathing    Practice in adductor butterfly  Practice in happy baby - grabbing feet    Postural Re-Edu      Pelvic Coordination  Pelvic eval: edu on findings   - pelvic coordination practice:      - inhale and relax practice      - exhale and contract x8          Coordination with EMG Biofeedback y Pelvic floor muscle strengthening with [employer] biofeedback unit, external sensors placed at 9 and 3 o'clock of anal opening, grounding electrode placed on ischial tuberosity:   - SUPINE:     - quick 10x     - slow 10x5 sec *slow to relax initially   - SEATED:     - quick 10x     - slow 5x5 sec      - slow 5x10 sec     -  50%>100% x5     - 100%>50%     - 1x40 sec  - STANDING:     - quick 10x     - slow 5x5 sec      - slow 5x10 sec     - 50%>100% x5     - 100%>50%     - 1x40 sec      Biofeedback Objective Findings y SUPINE REST: 2-3 uV range  SUPINE WORK: 15 uV for 5-10 seconds  SEATED REST: 5>0-1 uV range, with practice  SEATED WORK: 15-20 uV for 10 sec  STANDING REST: 5>3 uV range  STANDING WORK: 15-20 uV for 10 sec plateau    *40 sec attempt 15>10 uV reduction in plateau  *slow to relax 50-75% of the time    MANUAL                 [id number]   0 mins   Joint Mobilization       Deep Tissue mobilizations External      Deep Tissue Mobilization Internal      IASTM/MFR/TrP Release              PLAN:   - TE coordination next   - check bowel fullness again   - biofeedback - [place]?   - review all edu to date   - practice breathing

## 2023-09-08 NOTE — OP PT TREATMENT LOG
Pt is a 68 y.o. F with complaint of bowel urgency over the last couple years. Stopped drinking milk and eating less dairy but did not seem to help. In the past, pt was provided with slow and fast PFM contraction exercises that may have somewhat helped her bowel urgency, but then thought that more kegels may have stimulated more bladder problems.     Bladder: Comment:            Urination frequency About 7+ x/day   Urgency:  Bladder urgency may be related to need for a BM    Can have a strong bladder urge daily   Incontinence UUI not daily, a couple drops  Denies ZULLY   Pads 1 liner, maybe 2 with a leak   Nocturia 2x/night   Pain denies   Emptying WNL   Prolapse symptoms  Denies symptoms   Liquid consumption 1 mug coffee  Water WNL   UTI history 2x in last 2 years   Irritants          Bowel: Comment:        Frequency 3x/day   Urge Very strong moments of urgency daily   Incontinence Denies but worried about not making it in time   Emptying denies   [place] stool 4-6   Fiber    Management No red meat  No milk    Likes bread and bagels  2 servings fruits/veggies/day   Pain Denies  Hx of hemorrhoids    1 BM when awakes, 1 after coffee, 1-2 later in the day     OBGYN Comment:        Pregnancies 5   Births 4   Birth type vaginal   Tears/ episiotomies Episiotomy with 1st  Small tearing with others   Surgery denies   Menstruation menopause                   Sexual Activity Comment:    With partner   Type Minimally sexually active currently   Pain Denies pain or complaints with intimacy   Orgasm    Masturbation    Libido              Pain Comment:        denies                    Other Comment:        Physical activity  Hike 1x/wk  Walking more regularly   PLOF    Living environment    Other    Sleep              Systems Review:   Cord questions:  Pins and needles or tingling in both arms and both legs at the same time? denies  Problems with stumbling or falling? Denies     Cauda equina questions:  Problems with bowel and  bladder control? Specifically retention? See above  Pins and needles or numbness in the saddle area? Denies     Review of systems:  General - (chills, night sweats, recent infection, fever, weight loss/gain, unexplained night pain, excessive fatigue): Denies  Do you have a history of cancer? Denies  Gastrointestinal system - (abdominal pain, bowel changes, nausea, vomiting, bloating): Denies  Cardiovascular system - (chest pain, palpitations, orthopnea, other): Denies  Respiratory system - (cough, SOB, sputum production, other): Denies  Musculoskeletal system: osteoporosis, vertebral fracture? Denies  Endocrine - (polyuria, polydipsia, heat or cold intolerance, other): Denies  Neurological - (numbness/tingling, falling/stumbling, HA, dizziness, diplopia, dysphagia/dysarthria, double vision, tinnitus, memory, drop attacks, other): Denies  Any long-term steroid use? Denies      Patient goals: reduce fecal urgency, reduce urinary urgency    OBJECTIVE FINDINGS:      ASSESSMENT   PELVIS/POSTURE            LUMBAR AROM    Flexion    Extension    rotation    Sidebending        HIP ROM    ER    IR    Flexion    Extension    Ankle ROM    Lower Extremity Strength    hip flexion    hip extension    Hip IR    Hip ER    Hip abduction    Knee    Ankle            SPECIAL TESTS    Hamstring length    Hip FADIR    Hip SCOUR    Trendelenburg    Slump test    SLR test    CADE            LUMBAR PALPATION    HIP PALPATION    OTHER PALPATION        SI JOINT TESTING     S/L Compression test    Thigh thrust test    Prone sacral thrust test    Gaenslen test        BALANCE    GAIT MECHANICS    RUNNING MECHANICS    SQUATTING MECHANICS    LUNGING MECHANICS    BED MOBILITY    SIT TO STAND    FLOOR TO STAND      Verbal consent give for internal evaluation and treatment. yes    Pelvic Floor MMT and Function INITIAL EVALUATION 8/2/23 F/U ASSESSMENT 8/31/23   Consent to eval and treat yes    Assessment position Hooklying, draped with sheet      EXTERNAL PFM EXAM No TTP B  Min-mod tension B    PFM OBSERVATION     Skin integrity Atrophy  Minor presence of labia minora  Skin redness  Large skin tag anal opening    Contract Present, able to minimize gluteal contraction    Relax present    Bulge/pelvic drop present    Cough absent    Perineal descent none    Vaginal wall laxity Anterior, not to level of hymen    Light touch sensation intact    INTERNAL Assessment performed Internal vaginal     Levator Ani PERF       Power 3/5     Endurance 3 seconds     Repetitions 5     Quick Flicks * x in 10 sec     Vaginal introitus WNL    Nora-urethra No TTP endopelvic fascia  L>R presence of contract at layer 2 muscles    Levator palpation Mod tension B  Able to relax with inhale  No TTP B    Puborectalis      Obturator Internus Neg TTP     Piriformis      TAILBONE          ABDOMEN     MMT strength  Improved TAC and breath coordination  WNL    Diastasis Recti None present     Pain None present  No LLQ fullness present  No bladder urgency     Breathing pattern WNL     Hip flexors No TTP    Skin integumentary/incision lines WNL          PFDI OUTCOME MEASURE 50 total; 8.3 POPDI, 12.5 CRAD, 29.2 SMOOTH 53.1 total; 0 POPDI, 28.1 CRAD, 25 SMOOTH   Adductors      Biofeedback                  TREATMENT LOG:  verbal consent for internal intervention: yes  Diagnosis  Bowel and bladder urgency    Precautions       PT INITIAL EVALUATION:   8/2/23     PT PROGRESS NOTE:        Y/N Treatment Details: Time:   MODALITIES  77070   0 mins   Heat       Ice       THER ACT          [id number]   24 mins   OUTCOME MEASURES  PFDI    POC discussion y     Review of symptoms  Tests/Measures y     Falls Screen, Medication Review, VS, pain assessment Y      Education:  Y    h    h  h Pelvic floor anatomy/ purpose function    POP precautions: avoid pushing/straining; bend from knees/hips; no holding breath  Hemorrhoid edu  Vaginal atrophy edu: use of vaginal estrogen or coconut oil prn    *Pt verbalized  understanding of education and returned demonstration appropriately*    Bladder Education  Healthy bladder norms  Bladder irritants  WNL fluid intake for bladder (pt likely WNL)  GOAL: 2-4 hours b/n bathroom trips    Bowel Education         y  y          y      y Healthy bowel norms  Bowel irritants  Consistent meal times, consistent meal ingredients, night of rest and digest  GOAL: 1 serving fruit or veg with each meal for now  Fiber: may consider small dose of fiber supplement and assess response over the course of the week    No greater than 15 min on the toilet; add deep breathing to relax  FECAL urge suppression: RAIR reflex, 40 sec hold; deep breathing, mental distraction; cont to practice prn when away from home (grocery store)  - practice RAIR perhaps after breakfast, delay urge for 10-15 min then use BR    Toilet Posture edu/demo/practice y Toilet seat positioning, edu and rationale    Postural edu      HEP / HEP Review Y eval: deep breathing  8/11/23: toilet seat positioning; happy baby str, adductor str; 20x5 sec coordination PFMC with exhale  8/31/23: hip abd, hip add, bridge, wall squat, plank at counter, row    THER EX         [id number]   30 mins   SUPINE THEREX:   h  h  h  h TAC/PFMC in isolation 15x at pace of breath  TAC with ADD, 5 sec hold  TAC with hip ABD gtb, 5 sec hold  TAC with bridge, gtb 5sec hold    *all with breath coordination    SEATED THEREX:         STANDING THEREX:   h  h  y  y    y - shoulder row, gtb  - wall squat 2 sets to fatigue - just breathing  - counter plank 2 sets to fatigue - just breathing  - wall plank at elbows 1 set - just breathing    RAIR practice: 40 sec holds   - shoulder mid row   - shoulder low row   - shoulder hADD at [facility]      STRETCHING:               NEURO RE-ED    [id number]   0 mins   Diaphragmatic breathing    Practice in adductor butterfly  Practice in happy baby - grabbing feet    Postural Re-Edu      Pelvic Coordination  Pelvic eval: edu on findings   -  pelvic coordination practice:      - inhale and relax practice      - exhale and contract x8          Coordination with EMG Biofeedback y Pelvic floor muscle strengthening with [employer] biofeedback unit, external sensors placed at 9 and 3 o'clock of anal opening, grounding electrode placed on ischial tuberosity:   - SUPINE:     - quick 10x     - slow 10x5 sec *slow to relax initially   - SEATED:     - quick 10x     - slow 5x5 sec      - slow 5x10 sec     - 50%>100% x5     - 100%>50%     - 1x40 sec  - STANDING:     - quick 10x     - slow 5x5 sec      - slow 5x10 sec     - 50%>100% x5     - 100%>50%     - 1x40 sec      Biofeedback Objective Findings y SUPINE REST: 2-3 uV range  SUPINE WORK: 15 uV for 5-10 seconds  SEATED REST: 5>0-1 uV range, with practice  SEATED WORK: 15-20 uV for 10 sec  STANDING REST: 5>3 uV range  STANDING WORK: 15-20 uV for 10 sec plateau    *40 sec attempt 15>10 uV reduction in plateau  *slow to relax 50-75% of the time    MANUAL                 [id number]   0 mins   Joint Mobilization       Deep Tissue mobilizations External      Deep Tissue Mobilization Internal      IASTM/MFR/TrP Release              PLAN:   - TE coordination next   - check bowel fullness again   - biofeedback - [place]?   - review all edu to date   - practice breathing

## 2023-09-13 ENCOUNTER — HOSPITAL ENCOUNTER (OUTPATIENT)
Dept: PHYSICAL THERAPY | Age: 68
Setting detail: THERAPIES SERIES
Discharge: HOME | End: 2023-09-13
Attending: NURSE PRACTITIONER
Payer: MEDICARE

## 2023-09-13 DIAGNOSIS — R39.15 URGENCY OF URINATION: Primary | ICD-10-CM

## 2023-09-13 PROCEDURE — 97110 THERAPEUTIC EXERCISES: CPT | Mod: GP

## 2023-09-13 PROCEDURE — 97530 THERAPEUTIC ACTIVITIES: CPT | Mod: GP

## 2023-09-13 NOTE — PROGRESS NOTES
Physical Therapy Visit    PT DAILY NOTE FOR OUTPATIENT THERAPY    Patient: Mary Roque MRN: [id number]  : 1955 68 y.o.  Referring Physician: Molly Talbot CRNP  Date of Visit: 2023    Certification Dates: 23 through 10/31/23    Diagnosis:   1. Urgency of urination            TODAY'S VISIT    Time In Session:  Start Time: 0802  Stop Time: 0900  Time Calculation (min): 58 min   History/Vitals/Pain/Encounter Info - 23 0803        Injury History/Precautions/Daily Required Info    Document Type daily treatment     Patient/Family/Caregiver Comments/Observations Questions about bladder urgency, had to tell when bladder is truly full. Often holds urine for 2-4 hours without issue, but still has moments of urgency. Bowel control might be a little better this week, more aware of diet, has not bought fiber supplement yet.     Patient reported fall since last visit No        Pain Assessment    Currently in pain No/Denies                Daily Treatment Assessment and Plan - 23 08        Daily Treatment Assessment and Plan    Progress toward goals Progressing     Daily Outcome Summary Bladder urgency suppression strategies provided and greater education on healthy bladder norms. COntinuing per PCO to improve bladder and bowel urgency, and pt is slowly progressing towards her goals.                         Today's Treatment:    Pt is a 68 y.o. F with complaint of bowel urgency over the last couple years. Stopped drinking milk and eating less dairy but did not seem to help. In the past, pt was provided with slow and fast PFM contraction exercises that may have somewhat helped her bowel urgency, but then thought that more kegels may have stimulated more bladder problems.     Bladder: Comment:            Urination frequency About 7+ x/day   Urgency:  Bladder urgency may be related to need for a BM    Can have a strong bladder urge daily   Incontinence UUI not daily, a couple drops  Denies  ZULLY   Pads 1 liner, maybe 2 with a leak   Nocturia 2x/night   Pain denies   Emptying WNL   Prolapse symptoms  Denies symptoms   Liquid consumption 1 mug coffee  Water WNL   UTI history 2x in last 2 years   Irritants          Bowel: Comment:        Frequency 3x/day   Urge Very strong moments of urgency daily   Incontinence Denies but worried about not making it in time   Emptying denies   [place] stool 4-6   Fiber    Management No red meat  No milk    Likes bread and bagels  2 servings fruits/veggies/day   Pain Denies  Hx of hemorrhoids    1 BM when awakes, 1 after coffee, 1-2 later in the day     OBGYN Comment:        Pregnancies 5   Births 4   Birth type vaginal   Tears/ episiotomies Episiotomy with 1st  Small tearing with others   Surgery denies   Menstruation menopause                   Sexual Activity Comment:    With partner   Type Minimally sexually active currently   Pain Denies pain or complaints with intimacy   Orgasm    Masturbation    Libido              Pain Comment:        denies                    Other Comment:        Physical activity  Hike 1x/wk  Walking more regularly   PLOF    Living environment    Other    Sleep              Systems Review:   Cord questions:  Pins and needles or tingling in both arms and both legs at the same time? denies  Problems with stumbling or falling? Denies     Cauda equina questions:  Problems with bowel and bladder control? Specifically retention? See above  Pins and needles or numbness in the saddle area? Denies     Review of systems:  General - (chills, night sweats, recent infection, fever, weight loss/gain, unexplained night pain, excessive fatigue): Denies  Do you have a history of cancer? Denies  Gastrointestinal system - (abdominal pain, bowel changes, nausea, vomiting, bloating): Denies  Cardiovascular system - (chest pain, palpitations, orthopnea, other): Denies  Respiratory system - (cough, SOB, sputum production, other): Denies  Musculoskeletal system:  osteoporosis, vertebral fracture? Denies  Endocrine - (polyuria, polydipsia, heat or cold intolerance, other): Denies  Neurological - (numbness/tingling, falling/stumbling, HA, dizziness, diplopia, dysphagia/dysarthria, double vision, tinnitus, memory, drop attacks, other): Denies  Any long-term steroid use? Denies      Patient goals: reduce fecal urgency, reduce urinary urgency    OBJECTIVE FINDINGS:      ASSESSMENT   PELVIS/POSTURE            LUMBAR AROM    Flexion    Extension    rotation    Sidebending        HIP ROM    ER    IR    Flexion    Extension    Ankle ROM    Lower Extremity Strength    hip flexion    hip extension    Hip IR    Hip ER    Hip abduction    Knee    Ankle            SPECIAL TESTS    Hamstring length    Hip FADIR    Hip SCOUR    Trendelenburg    Slump test    SLR test    CADE            LUMBAR PALPATION    HIP PALPATION    OTHER PALPATION        SI JOINT TESTING     S/L Compression test    Thigh thrust test    Prone sacral thrust test    Gaenslen test        BALANCE    GAIT MECHANICS    RUNNING MECHANICS    SQUATTING MECHANICS    LUNGING MECHANICS    BED MOBILITY    SIT TO STAND    FLOOR TO STAND      Verbal consent give for internal evaluation and treatment. yes    Pelvic Floor MMT and Function INITIAL EVALUATION 8/2/23 F/U ASSESSMENT 8/31/23   Consent to eval and treat yes    Assessment position Hooklying, draped with sheet     EXTERNAL PFM EXAM No TTP B  Min-mod tension B    PFM OBSERVATION     Skin integrity Atrophy  Minor presence of labia minora  Skin redness  Large skin tag anal opening    Contract Present, able to minimize gluteal contraction    Relax present    Bulge/pelvic drop present    Cough absent    Perineal descent none    Vaginal wall laxity Anterior, not to level of hymen    Light touch sensation intact    INTERNAL Assessment performed Internal vaginal     Levator Ani PERF       Power 3/5     Endurance 3 seconds     Repetitions 5     Quick Flicks * x in 10 sec     Vaginal  introitus WNL    Nora-urethra No TTP endopelvic fascia  L>R presence of contract at layer 2 muscles    Levator palpation Mod tension B  Able to relax with inhale  No TTP B    Puborectalis      Obturator Internus Neg TTP     Piriformis      TAILBONE          ABDOMEN     MMT strength  Improved TAC and breath coordination  WNL    Diastasis Recti None present     Pain None present  No LLQ fullness present  No bladder urgency     Breathing pattern WNL     Hip flexors No TTP    Skin integumentary/incision lines WNL          PFDI OUTCOME MEASURE 50 total; 8.3 POPDI, 12.5 CRAD, 29.2 SMOOTH 53.1 total; 0 POPDI, 28.1 CRAD, 25 SMOOTH   Adductors      Biofeedback                  TREATMENT LOG:  verbal consent for internal intervention: yes  Diagnosis  Bowel and bladder urgency    Precautions       PT INITIAL EVALUATION:   8/2/23     PT PROGRESS NOTE:        Y/N Treatment Details: Time:   MODALITIES  42034   0 mins   Heat       Ice       THER ACT          12046   24 mins   OUTCOME MEASURES  PFDI    POC discussion y     Review of symptoms  Tests/Measures y     Falls Screen, Medication Review, VS, pain assessment Y      Education:  h    h    h  h Pelvic floor anatomy/ purpose function    POP precautions: avoid pushing/straining; bend from knees/hips; no holding breath  Hemorrhoid edu  Vaginal atrophy edu: use of vaginal estrogen or coconut oil prn    *Pt verbalized understanding of education and returned demonstration appropriately*    Bladder Education       y  y  y Healthy bladder norms  Bladder irritants  WNL fluid intake for bladder (pt likely WNL)  GOAL: 2-4 hours b/n bathroom trips  8 sec rule  Bladder urgency suppression strategies, handout provided    Bowel Education         h  h          y      y Healthy bowel norms  Bowel irritants  Consistent meal times, consistent meal ingredients, night of rest and digest  GOAL: 1 serving fruit or veg with each meal for now  Fiber: may consider small dose of fiber supplement and assess  response over the course of the week    No greater than 15 min on the toilet; add deep breathing to relax  FECAL urge suppression: RAIR reflex, 40 sec hold; deep breathing, mental distraction; cont to practice prn when away from home (grocery store)  - practice RAIR perhaps after breakfast, delay urge for 10-15 min then use BR    Toilet Posture edu/demo/practice y Toilet seat positioning, edu and rationale    Postural edu      HEP / HEP Review Y eval: deep breathing  8/11/23: toilet seat positioning; happy baby str, adductor str; 20x5 sec coordination PFMC with exhale  8/31/23: hip abd, hip add, bridge, wall squat, plank at counter, row  9/13/23: bladder urgency suppression strategies    THER EX         08087   30 mins   SUPINE THEREX:   h  y  h  y TAC/PFMC in isolation 15x at pace of breath  TAC with ADD, 5 sec hold  TAC with hip ABD gtb, 5 sec hold  TAC with bridge, hip add with ball    *all with breath coordination    SEATED THEREX:         STANDING THEREX:   y  y  y  h    y  h  h  h  y - shoulder row, gtb, in tandem stance  - wall squat 2 sets to fatigue , with hip add  - counter plank 1 set to fatigue B, with march  - wall plank at elbows 1 set - just breathing    RAIR practice: 40 sec holds   - shoulder mid row   - shoulder low row   - shoulder hADD at bolster   - hip add with ball      STRETCHING:               NEURO RE-ED    62578   0 mins   Diaphragmatic breathing    Practice in adductor butterfly  Practice in happy baby - grabbing feet    Postural Re-Edu      Pelvic Coordination  Pelvic eval: edu on findings   - pelvic coordination practice:      - inhale and relax practice      - exhale and contract x8          Coordination with EMG Biofeedback y Pelvic floor muscle strengthening with [email or web address] biofeedback unit, external sensors placed at 9 and 3 o'clock of anal opening, grounding electrode placed on ischial tuberosity:   - SUPINE:     - quick 10x     - slow 10x5 sec *slow to relax initially   -  SEATED:     - quick 10x     - slow 5x5 sec      - slow 5x10 sec     - 50%>100% x5     - 100%>50%     - 1x40 sec  - STANDING:     - quick 10x     - slow 5x5 sec      - slow 5x10 sec     - 50%>100% x5     - 100%>50%     - 1x40 sec      Biofeedback Objective Findings y SUPINE REST: 2-3 uV range  SUPINE WORK: 15 uV for 5-10 seconds  SEATED REST: 5>0-1 uV range, with practice  SEATED WORK: 15-20 uV for 10 sec  STANDING REST: 5>3 uV range  STANDING WORK: 15-20 uV for 10 sec plateau    *40 sec attempt 15>10 uV reduction in plateau  *slow to relax 50-75% of the time    MANUAL                 92023   0 mins   Joint Mobilization       Deep Tissue mobilizations External      Deep Tissue Mobilization Internal      IASTM/MFR/TrP Release              PLAN:   - check bowel fullness again   - biofeedback - NV?   - review all edu to date   - practice breathing

## 2023-09-13 NOTE — OP PT TREATMENT LOG
Pt is a 68 y.o. F with complaint of bowel urgency over the last couple years. Stopped drinking milk and eating less dairy but did not seem to help. In the past, pt was provided with slow and fast PFM contraction exercises that may have somewhat helped her bowel urgency, but then thought that more kegels may have stimulated more bladder problems.     Bladder: Comment:            Urination frequency About 7+ x/day   Urgency:  Bladder urgency may be related to need for a BM    Can have a strong bladder urge daily   Incontinence UUI not daily, a couple drops  Denies ZULLY   Pads 1 liner, maybe 2 with a leak   Nocturia 2x/night   Pain denies   Emptying WNL   Prolapse symptoms  Denies symptoms   Liquid consumption 1 mug coffee  Water WNL   UTI history 2x in last 2 years   Irritants          Bowel: Comment:        Frequency 3x/day   Urge Very strong moments of urgency daily   Incontinence Denies but worried about not making it in time   Emptying denies   [place] stool 4-6   Fiber    Management No red meat  No milk    Likes bread and bagels  2 servings fruits/veggies/day   Pain Denies  Hx of hemorrhoids    1 BM when awakes, 1 after coffee, 1-2 later in the day     OBGYN Comment:        Pregnancies 5   Births 4   Birth type vaginal   Tears/ episiotomies Episiotomy with 1st  Small tearing with others   Surgery denies   Menstruation menopause                   Sexual Activity Comment:    With partner   Type Minimally sexually active currently   Pain Denies pain or complaints with intimacy   Orgasm    Masturbation    Libido              Pain Comment:        denies                    Other Comment:        Physical activity  Hike 1x/wk  Walking more regularly   PLOF    Living environment    Other    Sleep              Systems Review:   Cord questions:  Pins and needles or tingling in both arms and both legs at the same time? denies  Problems with stumbling or falling? Denies     Cauda equina questions:  Problems with bowel and  bladder control? Specifically retention? See above  Pins and needles or numbness in the saddle area? Denies     Review of systems:  General - (chills, night sweats, recent infection, fever, weight loss/gain, unexplained night pain, excessive fatigue): Denies  Do you have a history of cancer? Denies  Gastrointestinal system - (abdominal pain, bowel changes, nausea, vomiting, bloating): Denies  Cardiovascular system - (chest pain, palpitations, orthopnea, other): Denies  Respiratory system - (cough, SOB, sputum production, other): Denies  Musculoskeletal system: osteoporosis, vertebral fracture? Denies  Endocrine - (polyuria, polydipsia, heat or cold intolerance, other): Denies  Neurological - (numbness/tingling, falling/stumbling, HA, dizziness, diplopia, dysphagia/dysarthria, double vision, tinnitus, memory, drop attacks, other): Denies  Any long-term steroid use? Denies      Patient goals: reduce fecal urgency, reduce urinary urgency    OBJECTIVE FINDINGS:      ASSESSMENT   PELVIS/POSTURE            LUMBAR AROM    Flexion    Extension    rotation    Sidebending        HIP ROM    ER    IR    Flexion    Extension    Ankle ROM    Lower Extremity Strength    hip flexion    hip extension    Hip IR    Hip ER    Hip abduction    Knee    Ankle            SPECIAL TESTS    Hamstring length    Hip FADIR    Hip SCOUR    Trendelenburg    Slump test    SLR test    CADE            LUMBAR PALPATION    HIP PALPATION    OTHER PALPATION        SI JOINT TESTING     S/L Compression test    Thigh thrust test    Prone sacral thrust test    Gaenslen test        BALANCE    GAIT MECHANICS    RUNNING MECHANICS    SQUATTING MECHANICS    LUNGING MECHANICS    BED MOBILITY    SIT TO STAND    FLOOR TO STAND      Verbal consent give for internal evaluation and treatment. yes    Pelvic Floor MMT and Function INITIAL EVALUATION 8/2/23 F/U ASSESSMENT 8/31/23   Consent to eval and treat yes    Assessment position Hooklying, draped with sheet      EXTERNAL PFM EXAM No TTP B  Min-mod tension B    PFM OBSERVATION     Skin integrity Atrophy  Minor presence of labia minora  Skin redness  Large skin tag anal opening    Contract Present, able to minimize gluteal contraction    Relax present    Bulge/pelvic drop present    Cough absent    Perineal descent none    Vaginal wall laxity Anterior, not to level of hymen    Light touch sensation intact    INTERNAL Assessment performed Internal vaginal     Levator Ani PERF       Power 3/5     Endurance 3 seconds     Repetitions 5     Quick Flicks * x in 10 sec     Vaginal introitus WNL    Nora-urethra No TTP endopelvic fascia  L>R presence of contract at layer 2 muscles    Levator palpation Mod tension B  Able to relax with inhale  No TTP B    Puborectalis      Obturator Internus Neg TTP     Piriformis      TAILBONE          ABDOMEN     MMT strength  Improved TAC and breath coordination  WNL    Diastasis Recti None present     Pain None present  No LLQ fullness present  No bladder urgency     Breathing pattern WNL     Hip flexors No TTP    Skin integumentary/incision lines WNL          PFDI OUTCOME MEASURE 50 total; 8.3 POPDI, 12.5 CRAD, 29.2 SMOOTH 53.1 total; 0 POPDI, 28.1 CRAD, 25 SMOOTH   Adductors      Biofeedback                  TREATMENT LOG:  verbal consent for internal intervention: yes  Diagnosis  Bowel and bladder urgency    Precautions       PT INITIAL EVALUATION:   8/2/23     PT PROGRESS NOTE:        Y/N Treatment Details: Time:   MODALITIES  91353   0 mins   Heat       Ice       THER ACT          17285   24 mins   OUTCOME MEASURES  PFDI    POC discussion y     Review of symptoms  Tests/Measures y     Falls Screen, Medication Review, VS, pain assessment Y      Education:  h    h    h  h Pelvic floor anatomy/ purpose function    POP precautions: avoid pushing/straining; bend from knees/hips; no holding breath  Hemorrhoid edu  Vaginal atrophy edu: use of vaginal estrogen or coconut oil prn    *Pt verbalized  understanding of education and returned demonstration appropriately*    Bladder Education       y  y  y Healthy bladder norms  Bladder irritants  WNL fluid intake for bladder (pt likely WNL)  GOAL: 2-4 hours b/n bathroom trips  8 sec rule  Bladder urgency suppression strategies, handout provided    Bowel Education         h  h          y      y Healthy bowel norms  Bowel irritants  Consistent meal times, consistent meal ingredients, night of rest and digest  GOAL: 1 serving fruit or veg with each meal for now  Fiber: may consider small dose of fiber supplement and assess response over the course of the week    No greater than 15 min on the toilet; add deep breathing to relax  FECAL urge suppression: RAIR reflex, 40 sec hold; deep breathing, mental distraction; cont to practice prn when away from home (grocery store)  - practice RAIR perhaps after breakfast, delay urge for 10-15 min then use BR    Toilet Posture edu/demo/practice y Toilet seat positioning, edu and rationale    Postural edu      HEP / HEP Review Y eval: deep breathing  8/11/23: toilet seat positioning; happy baby str, adductor str; 20x5 sec coordination PFMC with exhale  8/31/23: hip abd, hip add, bridge, wall squat, plank at counter, row  9/13/23: bladder urgency suppression strategies    THER EX         39813   30 mins   SUPINE THEREX:   h  y  h  y TAC/PFMC in isolation 15x at pace of breath  TAC with ADD, 5 sec hold  TAC with hip ABD gtb, 5 sec hold  TAC with bridge, hip add with ball    *all with breath coordination    SEATED THEREX:         STANDING THEREX:   y  y  y  h    y  h  h  h  y - shoulder row, gtb, in tandem stance  - wall squat 2 sets to fatigue , with hip add  - counter plank 1 set to fatigue B, with march  - wall plank at elbows 1 set - just breathing    RAIR practice: 40 sec holds   - shoulder mid row   - shoulder low row   - shoulder hADD at bolster   - hip add with ball      STRETCHING:               NEURO RE-ED    50313   0 mins    Diaphragmatic breathing    Practice in adductor butterfly  Practice in happy baby - grabbing feet    Postural Re-Edu      Pelvic Coordination  Pelvic eval: edu on findings   - pelvic coordination practice:      - inhale and relax practice      - exhale and contract x8          Coordination with EMG Biofeedback y Pelvic floor muscle strengthening with [employer] biofeedback unit, external sensors placed at 9 and 3 o'clock of anal opening, grounding electrode placed on ischial tuberosity:   - SUPINE:     - quick 10x     - slow 10x5 sec *slow to relax initially   - SEATED:     - quick 10x     - slow 5x5 sec      - slow 5x10 sec     - 50%>100% x5     - 100%>50%     - 1x40 sec  - STANDING:     - quick 10x     - slow 5x5 sec      - slow 5x10 sec     - 50%>100% x5     - 100%>50%     - 1x40 sec      Biofeedback Objective Findings y SUPINE REST: 2-3 uV range  SUPINE WORK: 15 uV for 5-10 seconds  SEATED REST: 5>0-1 uV range, with practice  SEATED WORK: 15-20 uV for 10 sec  STANDING REST: 5>3 uV range  STANDING WORK: 15-20 uV for 10 sec plateau    *40 sec attempt 15>10 uV reduction in plateau  *slow to relax 50-75% of the time    MANUAL                 18909   0 mins   Joint Mobilization       Deep Tissue mobilizations External      Deep Tissue Mobilization Internal      IASTM/MFR/TrP Release              PLAN:   - check bowel fullness again   - biofeedback - NV?   - review all edu to date   - practice breathing           [employer] Covid 19 received; #1  03/30- #2 04/27/2021

## 2023-09-20 ENCOUNTER — HOSPITAL ENCOUNTER (OUTPATIENT)
Dept: PHYSICAL THERAPY | Age: 68
Setting detail: THERAPIES SERIES
Discharge: HOME | End: 2023-09-20
Attending: NURSE PRACTITIONER
Payer: MEDICARE

## 2023-09-20 DIAGNOSIS — R39.15 URGENCY OF URINATION: Primary | ICD-10-CM

## 2023-09-20 PROCEDURE — 97112 NEUROMUSCULAR REEDUCATION: CPT | Mod: GP

## 2023-09-20 PROCEDURE — 97530 THERAPEUTIC ACTIVITIES: CPT | Mod: GP

## 2023-09-20 NOTE — PROGRESS NOTES
Physical Therapy Visit    PT DAILY NOTE FOR OUTPATIENT THERAPY    Patient: Mary Roque MRN: [id number]  : 1955 68 y.o.  Referring Physician: Molly Talbot CRNP  Date of Visit: 2023    Certification Dates: 23 through 10/31/23    Diagnosis:   1. Urgency of urination            TODAY'S VISIT    Time In Session:  Start Time: 0802  Stop Time: 0858  Time Calculation (min): 56 min   History/Vitals/Pain/Encounter Info - 23 0803        Injury History/Precautions/Daily Required Info    Document Type daily treatment     Patient/Family/Caregiver Comments/Observations Reports a good week. Exercises going well. No pain complaints. Less urgency and frequency, still may have an occasional moment of UUI that may be about a drop or two. Bowels are slowly improving.     Patient reported fall since last visit No        Pain Assessment    Currently in pain No/Denies                Daily Treatment Assessment and Plan - 23 08        Daily Treatment Assessment and Plan    Progress toward goals Progressing     Daily Outcome Summary Good coordination of PFM today and good endurance. Pt has slowness to relax for 1-2 seconds only in standing, improved greatly in seated. Pt is verbalizing and demonstrating progress towards her goals.                     OBJECTIVE DATA TAKEN TODAY:      Today's Treatment:    Pt is a 68 y.o. F with complaint of bowel urgency over the last couple years. Stopped drinking milk and eating less dairy but did not seem to help. In the past, pt was provided with slow and fast PFM contraction exercises that may have somewhat helped her bowel urgency, but then thought that more kegels may have stimulated more bladder problems.     Bladder: Comment:            Urination frequency About 7+ x/day   Urgency:  Bladder urgency may be related to need for a BM    Can have a strong bladder urge daily   Incontinence UUI not daily, a couple drops  Denies ZULLY   Pads 1 liner, maybe 2 with a  leak   Nocturia 2x/night   Pain denies   Emptying WNL   Prolapse symptoms  Denies symptoms   Liquid consumption 1 mug coffee  Water WNL   UTI history 2x in last 2 years   Irritants          Bowel: Comment:        Frequency 3x/day   Urge Very strong moments of urgency daily   Incontinence Denies but worried about not making it in time   Emptying denies   [place] stool 4-6   Fiber    Management No red meat  No milk    Likes bread and bagels  2 servings fruits/veggies/day   Pain Denies  Hx of hemorrhoids    1 BM when awakes, 1 after coffee, 1-2 later in the day     OBGYN Comment:        Pregnancies 5   Births 4   Birth type vaginal   Tears/ episiotomies Episiotomy with 1st  Small tearing with others   Surgery denies   Menstruation menopause                   Sexual Activity Comment:    With partner   Type Minimally sexually active currently   Pain Denies pain or complaints with intimacy   Orgasm    Masturbation    Libido              Pain Comment:        denies                    Other Comment:        Physical activity  Hike 1x/wk  Walking more regularly   PLOF    Living environment    Other    Sleep              Systems Review:   Cord questions:  Pins and needles or tingling in both arms and both legs at the same time? denies  Problems with stumbling or falling? Denies     Cauda equina questions:  Problems with bowel and bladder control? Specifically retention? See above  Pins and needles or numbness in the saddle area? Denies     Review of systems:  General - (chills, night sweats, recent infection, fever, weight loss/gain, unexplained night pain, excessive fatigue): Denies  Do you have a history of cancer? Denies  Gastrointestinal system - (abdominal pain, bowel changes, nausea, vomiting, bloating): Denies  Cardiovascular system - (chest pain, palpitations, orthopnea, other): Denies  Respiratory system - (cough, SOB, sputum production, other): Denies  Musculoskeletal system: osteoporosis, vertebral fracture?  Denies  Endocrine - (polyuria, polydipsia, heat or cold intolerance, other): Denies  Neurological - (numbness/tingling, falling/stumbling, HA, dizziness, diplopia, dysphagia/dysarthria, double vision, tinnitus, memory, drop attacks, other): Denies  Any long-term steroid use? Denies      Patient goals: reduce fecal urgency, reduce urinary urgency    OBJECTIVE FINDINGS:      ASSESSMENT   PELVIS/POSTURE            LUMBAR AROM    Flexion    Extension    rotation    Sidebending        HIP ROM    ER    IR    Flexion    Extension    Ankle ROM    Lower Extremity Strength    hip flexion    hip extension    Hip IR    Hip ER    Hip abduction    Knee    Ankle            SPECIAL TESTS    Hamstring length    Hip FADIR    Hip SCOUR    Trendelenburg    Slump test    SLR test    CADE            LUMBAR PALPATION    HIP PALPATION    OTHER PALPATION        SI JOINT TESTING     S/L Compression test    Thigh thrust test    Prone sacral thrust test    Gaenslen test        BALANCE    GAIT MECHANICS    RUNNING MECHANICS    SQUATTING MECHANICS    LUNGING MECHANICS    BED MOBILITY    SIT TO STAND    FLOOR TO STAND      Verbal consent give for internal evaluation and treatment. yes    Pelvic Floor MMT and Function INITIAL EVALUATION 8/2/23 F/U ASSESSMENT 8/31/23   Consent to eval and treat yes    Assessment position Hooklying, draped with sheet     EXTERNAL PFM EXAM No TTP B  Min-mod tension B    PFM OBSERVATION     Skin integrity Atrophy  Minor presence of labia minora  Skin redness  Large skin tag anal opening    Contract Present, able to minimize gluteal contraction    Relax present    Bulge/pelvic drop present    Cough absent    Perineal descent none    Vaginal wall laxity Anterior, not to level of hymen    Light touch sensation intact    INTERNAL Assessment performed Internal vaginal     Levator Ani PERF       Power 3/5     Endurance 3 seconds     Repetitions 5     Quick Flicks * x in 10 sec     Vaginal introitus WNL    Nora-urethra No  TTP endopelvic fascia  L>R presence of contract at layer 2 muscles    Levator palpation Mod tension B  Able to relax with inhale  No TTP B    Puborectalis      Obturator Internus Neg TTP     Piriformis      TAILBONE          ABDOMEN     MMT strength  Improved TAC and breath coordination  WNL    Diastasis Recti None present     Pain None present  No LLQ fullness present  No bladder urgency     Breathing pattern WNL     Hip flexors No TTP    Skin integumentary/incision lines WNL          PFDI OUTCOME MEASURE 50 total; 8.3 POPDI, 12.5 CRAD, 29.2 SMOOTH 53.1 total; 0 POPDI, 28.1 CRAD, 25 SMOOTH   Adductors      Biofeedback                  TREATMENT LOG:  verbal consent for internal intervention: yes  Diagnosis  Bowel and bladder urgency    Precautions       PT INITIAL EVALUATION:   8/2/23     PT PROGRESS NOTE:        Y/N Treatment Details: Time:   MODALITIES  99282   0 mins   Heat       Ice       THER ACT          [id number]   15 mins   OUTCOME MEASURES  PFDI    POC discussion y     Review of symptoms  Tests/Measures y     Falls Screen, Medication Review, VS, pain assessment Y      Education:  h    h    h  h Pelvic floor anatomy/ purpose function    POP precautions: avoid pushing/straining; bend from knees/hips; no holding breath  Hemorrhoid edu  Vaginal atrophy edu: use of vaginal estrogen or coconut oil prn    *Pt verbalized understanding of education and returned demonstration appropriately*    Bladder Education       y  h  y    y  y Healthy bladder norms  Bladder irritants  WNL fluid intake for bladder (pt likely WNL)  GOAL: 2-4 hours b/n bathroom trips  8 sec rule  Bladder urgency suppression strategies review    Try stopping flow of urine x1  Tally urinary freq current    Bowel Education         h  h          y      h Healthy bowel norms  Bowel irritants  Consistent meal times, consistent meal ingredients, night of rest and digest  GOAL: 1 serving fruit or veg with each meal for now  Fiber: may consider small dose of fiber  supplement and assess response over the course of the week    No greater than 15 min on the toilet; add deep breathing to relax  FECAL urge suppression: RAIR reflex, 40 sec hold; deep breathing, mental distraction; cont to practice prn when away from home (grocery store)  - practice RAIR perhaps after breakfast, delay urge for 10-15 min then use BR    Toilet Posture edu/demo/practice  Toilet seat positioning, edu and rationale    Postural edu      HEP / HEP Review Y eval: deep breathing  8/11/23: toilet seat positioning; happy baby str, adductor str; 20x5 sec coordination PFMC with exhale  8/31/23: hip abd, hip add, bridge, wall squat, plank at counter, row  9/13/23: bladder urgency suppression strategies  9/20/23: coordination exercises (50%>100, 100>50, 50>3 quick flicks)    THER EX         [id number]   0 mins   SUPINE THEREX:   h  y  h  y TAC/PFMC in isolation 15x at pace of breath  TAC with ADD, 5 sec hold  TAC with hip ABD gtb, 5 sec hold  TAC with bridge, hip add with ball    *all with breath coordination    SEATED THEREX:         STANDING THEREX:   y  y  y  h    y  h  h  h  y - shoulder row, gtb, in tandem stance  - wall squat 2 sets to fatigue , with hip add  - counter plank 1 set to fatigue B, with march  - wall plank at elbows 1 set - just breathing    RAIR practice: 40 sec holds   - shoulder mid row   - shoulder low row   - shoulder hADD at bolster   - hip add with ball      STRETCHING:               NEURO RE-ED    [id number]   40 mins   Diaphragmatic breathing    Practice in adductor butterfly  Practice in happy baby - grabbing feet    Postural Re-Edu      Pelvic Coordination  Pelvic eval: edu on findings   - pelvic coordination practice:      - inhale and relax practice      - exhale and contract x8          Coordination with EMG Biofeedback y Pelvic floor muscle strengthening with [employer] biofeedback unit, external sensors placed at 9 and 3 o'clock of anal opening, grounding electrode placed on ischial  tuberosity:   - SUPINE: HELD     - quick 10x     - slow 10x5 sec *slow to relax initially   - SEATED: YES     - quick 10x     - slow 5x5 sec      - slow 5x10 sec     - 50%>100% x10     - 100%>50% 10x     - 50%>3 quick flicks 10x     - 1x40 sec  - STANDING: YES     - quick 10x     - slow 5x10 sec     - 50%>100% x5     - 100%>50%     - 50%>3 quick flicks 5x     - 1x40 sec      Biofeedback Objective Findings h    y SUPINE REST: 2-3 uV range  SUPINE WORK: 15 uV for 5-10 seconds  SEATED REST: 0 uV   SEATED WORK: 15-20 uV for 10 sec  STANDING REST: 5>3 uV range, reduced quickly  STANDING WORK: 15-20 uV for 10 sec plateau    *40 sec attempt 15>10 uV reduction in plateau, in seated  *slow to relax 50-75% of the time    MANUAL                 [id number]   0 mins   Joint Mobilization       Deep Tissue mobilizations External      Deep Tissue Mobilization Internal      IASTM/MFR/TrP Release              PLAN:   - ask about urinary freq tally   - check bowel fullness again   - biofeedback - NV?   - review all edu to date   - practice breathing

## 2023-09-20 NOTE — OP PT TREATMENT LOG
Pt is a 68 y.o. F with complaint of bowel urgency over the last couple years. Stopped drinking milk and eating less dairy but did not seem to help. In the past, pt was provided with slow and fast PFM contraction exercises that may have somewhat helped her bowel urgency, but then thought that more kegels may have stimulated more bladder problems.     Bladder: Comment:            Urination frequency About 7+ x/day   Urgency:  Bladder urgency may be related to need for a BM    Can have a strong bladder urge daily   Incontinence UUI not daily, a couple drops  Denies ZULLY   Pads 1 liner, maybe 2 with a leak   Nocturia 2x/night   Pain denies   Emptying WNL   Prolapse symptoms  Denies symptoms   Liquid consumption 1 mug coffee  Water WNL   UTI history 2x in last 2 years   Irritants          Bowel: Comment:        Frequency 3x/day   Urge Very strong moments of urgency daily   Incontinence Denies but worried about not making it in time   Emptying denies   [place] stool 4-6   Fiber    Management No red meat  No milk    Likes bread and bagels  2 servings fruits/veggies/day   Pain Denies  Hx of hemorrhoids    1 BM when awakes, 1 after coffee, 1-2 later in the day     OBGYN Comment:        Pregnancies 5   Births 4   Birth type vaginal   Tears/ episiotomies Episiotomy with 1st  Small tearing with others   Surgery denies   Menstruation menopause                   Sexual Activity Comment:    With partner   Type Minimally sexually active currently   Pain Denies pain or complaints with intimacy   Orgasm    Masturbation    Libido              Pain Comment:        denies                    Other Comment:        Physical activity  Hike 1x/wk  Walking more regularly   PLOF    Living environment    Other    Sleep              Systems Review:   Cord questions:  Pins and needles or tingling in both arms and both legs at the same time? denies  Problems with stumbling or falling? Denies     Cauda equina questions:  Problems with bowel and  bladder control? Specifically retention? See above  Pins and needles or numbness in the saddle area? Denies     Review of systems:  General - (chills, night sweats, recent infection, fever, weight loss/gain, unexplained night pain, excessive fatigue): Denies  Do you have a history of cancer? Denies  Gastrointestinal system - (abdominal pain, bowel changes, nausea, vomiting, bloating): Denies  Cardiovascular system - (chest pain, palpitations, orthopnea, other): Denies  Respiratory system - (cough, SOB, sputum production, other): Denies  Musculoskeletal system: osteoporosis, vertebral fracture? Denies  Endocrine - (polyuria, polydipsia, heat or cold intolerance, other): Denies  Neurological - (numbness/tingling, falling/stumbling, HA, dizziness, diplopia, dysphagia/dysarthria, double vision, tinnitus, memory, drop attacks, other): Denies  Any long-term steroid use? Denies      Patient goals: reduce fecal urgency, reduce urinary urgency    OBJECTIVE FINDINGS:      ASSESSMENT   PELVIS/POSTURE            LUMBAR AROM    Flexion    Extension    rotation    Sidebending        HIP ROM    ER    IR    Flexion    Extension    Ankle ROM    Lower Extremity Strength    hip flexion    hip extension    Hip IR    Hip ER    Hip abduction    Knee    Ankle            SPECIAL TESTS    Hamstring length    Hip FADIR    Hip SCOUR    Trendelenburg    Slump test    SLR test    CADE            LUMBAR PALPATION    HIP PALPATION    OTHER PALPATION        SI JOINT TESTING     S/L Compression test    Thigh thrust test    Prone sacral thrust test    Gaenslen test        BALANCE    GAIT MECHANICS    RUNNING MECHANICS    SQUATTING MECHANICS    LUNGING MECHANICS    BED MOBILITY    SIT TO STAND    FLOOR TO STAND      Verbal consent give for internal evaluation and treatment. yes    Pelvic Floor MMT and Function INITIAL EVALUATION 8/2/23 F/U ASSESSMENT 8/31/23   Consent to eval and treat yes    Assessment position Hooklying, draped with sheet      EXTERNAL PFM EXAM No TTP B  Min-mod tension B    PFM OBSERVATION     Skin integrity Atrophy  Minor presence of labia minora  Skin redness  Large skin tag anal opening    Contract Present, able to minimize gluteal contraction    Relax present    Bulge/pelvic drop present    Cough absent    Perineal descent none    Vaginal wall laxity Anterior, not to level of hymen    Light touch sensation intact    INTERNAL Assessment performed Internal vaginal     Levator Ani PERF       Power 3/5     Endurance 3 seconds     Repetitions 5     Quick Flicks * x in 10 sec     Vaginal introitus WNL    Nora-urethra No TTP endopelvic fascia  L>R presence of contract at layer 2 muscles    Levator palpation Mod tension B  Able to relax with inhale  No TTP B    Puborectalis      Obturator Internus Neg TTP     Piriformis      TAILBONE          ABDOMEN     MMT strength  Improved TAC and breath coordination  WNL    Diastasis Recti None present     Pain None present  No LLQ fullness present  No bladder urgency     Breathing pattern WNL     Hip flexors No TTP    Skin integumentary/incision lines WNL          PFDI OUTCOME MEASURE 50 total; 8.3 POPDI, 12.5 CRAD, 29.2 SMOOTH 53.1 total; 0 POPDI, 28.1 CRAD, 25 SMOOTH   Adductors      Biofeedback                  TREATMENT LOG:  verbal consent for internal intervention: yes  Diagnosis  Bowel and bladder urgency    Precautions       PT INITIAL EVALUATION:   8/2/23     PT PROGRESS NOTE:        Y/N Treatment Details: Time:   MODALITIES  72316   0 mins   Heat       Ice       THER ACT          [id number]   15 mins   OUTCOME MEASURES  PFDI    POC discussion y     Review of symptoms  Tests/Measures y     Falls Screen, Medication Review, VS, pain assessment Y      Education:  h    h    h  h Pelvic floor anatomy/ purpose function    POP precautions: avoid pushing/straining; bend from knees/hips; no holding breath  Hemorrhoid edu  Vaginal atrophy edu: use of vaginal estrogen or coconut oil prn    *Pt verbalized  understanding of education and returned demonstration appropriately*    Bladder Education       y  h  y    y  y Healthy bladder norms  Bladder irritants  WNL fluid intake for bladder (pt likely WNL)  GOAL: 2-4 hours b/n bathroom trips  8 sec rule  Bladder urgency suppression strategies review    Try stopping flow of urine x1  Tally urinary freq current    Bowel Education         h  h          y      h Healthy bowel norms  Bowel irritants  Consistent meal times, consistent meal ingredients, night of rest and digest  GOAL: 1 serving fruit or veg with each meal for now  Fiber: may consider small dose of fiber supplement and assess response over the course of the week    No greater than 15 min on the toilet; add deep breathing to relax  FECAL urge suppression: RAIR reflex, 40 sec hold; deep breathing, mental distraction; cont to practice prn when away from home (grocery store)  - practice RAIR perhaps after breakfast, delay urge for 10-15 min then use BR    Toilet Posture edu/demo/practice  Toilet seat positioning, edu and rationale    Postural edu      HEP / HEP Review Y eval: deep breathing  8/11/23: toilet seat positioning; happy baby str, adductor str; 20x5 sec coordination PFMC with exhale  8/31/23: hip abd, hip add, bridge, wall squat, plank at counter, row  9/13/23: bladder urgency suppression strategies  9/20/23: coordination exercises (50%>100, 100>50, 50>3 quick flicks)    THER EX         [id number]   0 mins   SUPINE THEREX:   h  y  h  y TAC/PFMC in isolation 15x at pace of breath  TAC with ADD, 5 sec hold  TAC with hip ABD gtb, 5 sec hold  TAC with bridge, hip add with ball    *all with breath coordination    SEATED THEREX:         STANDING THEREX:   y  y  y  h    y  h  h  h  y - shoulder row, gtb, in tandem stance  - wall squat 2 sets to fatigue , with hip add  - counter plank 1 set to fatigue B, with march  - wall plank at elbows 1 set - just breathing    RAIR practice: 40 sec holds   - shoulder mid row   -  shoulder low row   - shoulder hADD at [facility]   - hip add with ball      STRETCHING:               NEURO RE-ED    [id number]   40 mins   Diaphragmatic breathing    Practice in adductor butterfly  Practice in happy baby - grabbing feet    Postural Re-Edu      Pelvic Coordination  Pelvic eval: edu on findings   - pelvic coordination practice:      - inhale and relax practice      - exhale and contract x8          Coordination with EMG Biofeedback y Pelvic floor muscle strengthening with [employer] biofeedback unit, external sensors placed at 9 and 3 o'clock of anal opening, grounding electrode placed on ischial tuberosity:   - SUPINE: HELD     - quick 10x     - slow 10x5 sec *slow to relax initially   - SEATED: YES     - quick 10x     - slow 5x5 sec      - slow 5x10 sec     - 50%>100% x10     - 100%>50% 10x     - 50%>3 quick flicks 10x     - 1x40 sec  - STANDING: YES     - quick 10x     - slow 5x10 sec     - 50%>100% x5     - 100%>50%     - 50%>3 quick flicks 5x     - 1x40 sec      Biofeedback Objective Findings h    y SUPINE REST: 2-3 uV range  SUPINE WORK: 15 uV for 5-10 seconds  SEATED REST: 0 uV   SEATED WORK: 15-20 uV for 10 sec  STANDING REST: 5>3 uV range, reduced quickly  STANDING WORK: 15-20 uV for 10 sec plateau    *40 sec attempt 15>10 uV reduction in plateau, in seated  *slow to relax 50-75% of the time    MANUAL                 [id number]   0 mins   Joint Mobilization       Deep Tissue mobilizations External      Deep Tissue Mobilization Internal      IASTM/MFR/TrP Release              PLAN:   - ask about urinary freq tally   - check bowel fullness again   - biofeedback - NV?   - review all edu to date   - practice breathing

## 2023-10-02 ENCOUNTER — HOSPITAL ENCOUNTER (OUTPATIENT)
Dept: PHYSICAL THERAPY | Age: 68
Setting detail: THERAPIES SERIES
Discharge: HOME | End: 2023-10-02
Attending: NURSE PRACTITIONER
Payer: MEDICARE

## 2023-10-02 DIAGNOSIS — R39.15 URGENCY OF URINATION: Primary | ICD-10-CM

## 2023-10-02 PROCEDURE — 97530 THERAPEUTIC ACTIVITIES: CPT | Mod: GP

## 2023-10-02 PROCEDURE — 97140 MANUAL THERAPY 1/> REGIONS: CPT | Mod: GP

## 2023-10-02 PROCEDURE — 97110 THERAPEUTIC EXERCISES: CPT | Mod: GP

## 2023-10-02 NOTE — PROGRESS NOTES
Physical Therapy Progress Note    PT PROGRESS NOTE FOR OUTPATIENT THERAPY    Patient: Mary Roque   MRN: [id number]  : 1955 68 y.o.    Referring Physician: Molly Talbot CRNP  Date of Visit: 10/2/2023    Certification Dates: 23 through 10/31/23    Recommended Frequency & Duration:  1 time/week for up to 3 months        Diagnosis:   1. Urgency of urination        Chief Complaints:  No chief complaint on file.      Precautions:        TODAY'S VISIT:    Time In Session:  Start Time: 0802  Stop Time: 0900  Time Calculation (min): 58 min   General Information - 10/02/23 0806        Session Details    Document Type progress note        General Information    Patient/Family/Caregiver Comments/Observations Patient reports to have less control on stopping urine stream and has multiple BM's from morning till noon. No c/o pain today. States to have decreased urinary frequency and is adherent with her HEP.                  Pain/Vitals - 10/02/23 0806        Pain Assessment    Currently in pain No/Denies                PT - 10/02/23 0806        Physical Therapy    Physical Therapy Specialty Pelvic Floor Program: Age 14+        PT Plan    Frequency of treatment 1 time/week     PT Duration 3 months     PT Cert From 23     PT Cert To 10/31/23     Date PT POC was sent to provider 23     Signed PT Plan of Care received?  Yes                Assessment and Plan - 10/02/23 0903        Assessment    Plan of Care reviewed and patient/family in agreement Yes     Clinical Assessment Patient reports to have decreased urinary and bowel frequency since last session. She continues to have bowel and urinary urgency. Education provided on role of PFM function, muscle tension impacting her ability to improve voluntary control. She presented with increased PFM hypertonicity w/Trpoints this session. Patient is adherent with HEP and verbalizes good understanding. Urge suppression strategies discussed with  emphasis on having volitional control till reaching the rest room. Encouraged to continue w/HEP and review next session. Patient's symptoms have improved well.                 OBJECTIVE MEASUREMENTS/DATA:          Outcome Measures        8/2/2023    09:06 8/31/2023    13:03   PT SUBJECTIVE Outcome Measures   Other PFDI 50 PFDI 53.1 total       Today's Treatment::    Education provided:  Yes: See treatment log for details of education provided  Methods: Discussion and Handout    Pt is a 68 y.o. F with complaint of bowel urgency over the last couple years. Stopped drinking milk and eating less dairy but did not seem to help. In the past, pt was provided with slow and fast PFM contraction exercises that may have somewhat helped her bowel urgency, but then thought that more kegels may have stimulated more bladder problems.     Bladder: Comment:            Urination frequency About 7+ x/day   Urgency:  Bladder urgency may be related to need for a BM    Can have a strong bladder urge daily   Incontinence UUI not daily, a couple drops  Denies ZULLY   Pads 1 liner, maybe 2 with a leak   Nocturia 2x/night   Pain denies   Emptying WNL   Prolapse symptoms  Denies symptoms   Liquid consumption 1 mug coffee  Water WNL   UTI history 2x in last 2 years   Irritants          Bowel: Comment:        Frequency 3x/day   Urge Very strong moments of urgency daily   Incontinence Denies but worried about not making it in time   Emptying denies   [place] stool 4-6   Fiber    Management No red meat  No milk    Likes bread and bagels  2 servings fruits/veggies/day   Pain Denies  Hx of hemorrhoids    1 BM when awakes, 1 after coffee, 1-2 later in the day     OBGYN Comment:        Pregnancies 5   Births 4   Birth type vaginal   Tears/ episiotomies Episiotomy with 1st  Small tearing with others   Surgery denies   Menstruation menopause                   Sexual Activity Comment:    With partner   Type Minimally sexually active currently   Pain  Denies pain or complaints with intimacy   Orgasm    Masturbation    Libido              Pain Comment:        denies                    Other Comment:        Physical activity  Hike 1x/wk  Walking more regularly   PLOF    Living environment    Other    Sleep              Systems Review:   Cord questions:  Pins and needles or tingling in both arms and both legs at the same time? denies  Problems with stumbling or falling? Denies     Cauda equina questions:  Problems with bowel and bladder control? Specifically retention? See above  Pins and needles or numbness in the saddle area? Denies     Review of systems:  General - (chills, night sweats, recent infection, fever, weight loss/gain, unexplained night pain, excessive fatigue): Denies  Do you have a history of cancer? Denies  Gastrointestinal system - (abdominal pain, bowel changes, nausea, vomiting, bloating): Denies  Cardiovascular system - (chest pain, palpitations, orthopnea, other): Denies  Respiratory system - (cough, SOB, sputum production, other): Denies  Musculoskeletal system: osteoporosis, vertebral fracture? Denies  Endocrine - (polyuria, polydipsia, heat or cold intolerance, other): Denies  Neurological - (numbness/tingling, falling/stumbling, HA, dizziness, diplopia, dysphagia/dysarthria, double vision, tinnitus, memory, drop attacks, other): Denies  Any long-term steroid use? Denies      Patient goals: reduce fecal urgency, reduce urinary urgency    OBJECTIVE FINDINGS:      ASSESSMENT   PELVIS/POSTURE            LUMBAR AROM    Flexion    Extension    rotation    Sidebending        HIP ROM    ER    IR    Flexion    Extension    Ankle ROM    Lower Extremity Strength    hip flexion    hip extension    Hip IR    Hip ER    Hip abduction    Knee    Ankle            SPECIAL TESTS    Hamstring length    Hip FADIR    Hip SCOUR    Trendelenburg    Slump test    SLR test    CADE            LUMBAR PALPATION    HIP PALPATION    OTHER PALPATION        SI JOINT  "TESTING     S/L Compression test    Thigh thrust test    Prone sacral thrust test    Gaenslen test        BALANCE    GAIT MECHANICS    RUNNING MECHANICS    SQUATTING MECHANICS    LUNGING MECHANICS    BED MOBILITY    SIT TO STAND    FLOOR TO STAND      Verbal consent give for internal evaluation and treatment. yes    Pelvic Floor MMT and Function INITIAL EVALUATION 8/2/23 F/U ASSESSMENT 10/2/23   Consent to eval and treat yes yes   Assessment position Hooklying, draped with sheet  hooklying   EXTERNAL PFM EXAM No TTP B  Min-mod tension B    PFM OBSERVATION     Skin integrity Atrophy  Minor presence of labia minora  Skin redness  Large skin tag anal opening \"   Contract Present, able to minimize gluteal contraction present   Relax present present   Bulge/pelvic drop present present   Cough absent absent   Perineal descent none none   Vaginal wall laxity Anterior, not to level of hymen Anterior, to level of hymen   Light touch sensation intact intact   INTERNAL Assessment performed Internal vaginal  internal vaginal   Levator Ani PERF       Power 3/5  3/5  Well coordinated with breathing  Mild slow to relax with inhale   Endurance 3 seconds  5   Repetitions 5  6   Quick Flicks * x in 10 sec     Vaginal introitus WNL hypotonicity   Nora-urethra No TTP endopelvic fascia  L>R presence of contract at layer 2 muscles No TTP  L>R presence of contract   Levator palpation Mod tension B  Able to relax with inhale  No TTP B Mod tension  B tenderness present  6-7 o'clock evidence of scarring   Puborectalis      Obturator Internus Neg TTP     Piriformis      TAILBONE          ABDOMEN     MMT strength  Improved TAC and breath coordination  WNL    Diastasis Recti None present     Pain None present  No LLQ fullness present  No bladder urgency     Breathing pattern WNL     Hip flexors No TTP    Skin integumentary/incision lines WNL          PFDI OUTCOME MEASURE 50 total; 8.3 POPDI, 12.5 CRAD, 29.2 SMOOTH 53.1 total; 0 POPDI, 28.1 CRAD, " 25 SMOOTH   Adductors      Biofeedback                  TREATMENT LOG:  verbal consent for internal intervention: yes  Diagnosis  Bowel and bladder urgency    Precautions       PT INITIAL EVALUATION:   8/2/23     PT PROGRESS NOTE:        Y/N Treatment Details: Time:   MODALITIES  15888   0 mins   Heat       Ice       THER ACT          39489   15 mins   OUTCOME MEASURES  PFDI    POC discussion y     Review of symptoms  Tests/Measures y     Falls Screen, Medication Review, VS, pain assessment Y      Education:  h    h    h  h Pelvic floor anatomy/ purpose function    POP precautions: avoid pushing/straining; bend from knees/hips; no holding breath  Hemorrhoid edu  Vaginal atrophy edu: use of vaginal estrogen or coconut oil prn    *Pt verbalized understanding of education and returned demonstration appropriately*    Bladder Education       y  h  y    y  y Healthy bladder norms  Bladder irritants  WNL fluid intake for bladder (pt likely WNL)  GOAL: 2-4 hours b/n bathroom trips  8 sec rule  Bladder urgency suppression strategies review    Try stopping flow of urine x1  Tally urinary freq current    Bowel Education         h  h          y      h Healthy bowel norms  Bowel irritants  Consistent meal times, consistent meal ingredients, night of rest and digest  GOAL: 1 serving fruit or veg with each meal for now  Fiber: may consider small dose of fiber supplement and assess response over the course of the week    No greater than 15 min on the toilet; add deep breathing to relax  FECAL urge suppression: RAIR reflex, 40 sec hold; deep breathing, mental distraction; cont to practice prn when away from home (grocery store)  - practice RAIR perhaps after breakfast, delay urge for 10-15 min then use BR    Toilet Posture edu/demo/practice y Toilet seat positioning, edu and rationale    Postural edu      HEP / HEP Review Y eval: deep breathing  8/11/23: toilet seat positioning; happy baby str, adductor str; 20x5 sec coordination [facility] [place] [place]  with exhale  8/31/23: hip abd, hip add, bridge, wall squat, plank at counter, row  9/13/23: bladder urgency suppression strategies  9/20/23: coordination exercises (50%>100, 100>50, 50>3 quick flicks)    THER EX         18402   8 mins   SUPINE THEREX:   h  y  h  y TAC/PFMC in isolation 15x at pace of breath  TAC with ADD, 5 sec hold  TAC with hip ABD gtb, 5 sec hold  TAC with bridge, hip add with ball    *all with breath coordination    SEATED THEREX:         STANDING THEREX:   n  n  n  h  n  h  h  h  n - shoulder row, gtb, in tandem stance  - wall squat 2 sets to fatigue , with hip add  - counter plank 1 set to fatigue B, with march  - wall plank at elbows 1 set - just breathing    RAIR practice: 40 sec holds   - shoulder mid row   - shoulder low row   - shoulder hADD at bolster   - hip add with ball      STRETCHING:   Y  Cat and cow w/inhale relax and exhale contract           NEURO RE-ED    26992   30 mins   Diaphragmatic breathing y  y Practice in adductor butterfly  Practice in happy baby - grabbing feet    Postural Re-Edu      Pelvic Coordination y  y  y  y Pelvic eval: edu on findings   - pelvic coordination practice:      - inhale and relax practice      - exhale and contract x8          Coordination with EMG Biofeedback n Pelvic floor muscle strengthening with [employer] biofeedback unit, external sensors placed at 9 and 3 o'clock of anal opening, grounding electrode placed on ischial tuberosity:   - SUPINE: HELD     - quick 10x     - slow 10x5 sec *slow to relax initially   - SEATED: YES     - quick 10x     - slow 5x5 sec      - slow 5x10 sec     - 50%>100% x10     - 100%>50% 10x     - 50%>3 quick flicks 10x     - 1x40 sec  - STANDING: YES     - quick 10x     - slow 5x10 sec     - 50%>100% x5     - 100%>50%     - 50%>3 quick flicks 5x     - 1x40 sec      Biofeedback Objective Findings h    n SUPINE REST: 2-3 uV range  SUPINE WORK: 15 uV for 5-10 seconds  SEATED REST: 0 uV   SEATED WORK: 15-20 uV for 10  sec  STANDING REST: 5>3 uV range, reduced quickly  STANDING WORK: 15-20 uV for 10 sec plateau    *40 sec attempt 15>10 uV reduction in plateau, in seated  *slow to relax 50-75% of the time    MANUAL                 11031   0 mins   Joint Mobilization       Deep Tissue mobilizations External      Deep Tissue Mobilization Internal      IASTM/MFR/TrP Release              PLAN:  - talk about laxity   - check bowel fullness again - practice bowel massage   - review all edu to date               Goals Addressed                 This Visit's Progress     Mutually agreed upon pain goal   On track     Mutually agreed upon pain goal: n/a    PATIENT STATED: REDUCE BOWEL AND BLADDER URGENCY       Pelvic PT Goals   On track     Pt will be I with initial HEP instruction to improve pt's bowel function and PFM tension: 3 wks MET    Pt will be I with x3 complete bowel emptying behavioral strategies for improving bowel health: 4 wks  IMPROVING    Pt will successfully demonstrate fecal urgency suppression strategies for 10 min, 90% of the time, for improved bowel health: 8 wks  IMPROVING    Pt will be I with diaphragmatic breathing for reducing tension, stress, pelvic pain complaints: 4 wks MET    Pt will demonstrate good coordination (contract, relax, drop) of PFM, evident per eval, for improving coordinated defecation: 12 wks MET    Pt will report no UUI complaints greater than 1x every 2 wks, for good bowel health: 12 wks  IMPROVING    Pt will report bladder freq WNL, day and night: 12 wks MET    Pt will be I with progression of PFM HEP instruction to maintain/improve bowel and pelvic health: 12 wks IMPROVING    PFDI score will improve to below 35, indicating improvements in bladder/bowel function and QOL (50 on IE): 12 wks NO CHANGE            Chau Berumen, PT

## 2023-10-11 ENCOUNTER — HOSPITAL ENCOUNTER (OUTPATIENT)
Dept: PHYSICAL THERAPY | Age: 68
Setting detail: THERAPIES SERIES
Discharge: HOME | End: 2023-10-11
Attending: NURSE PRACTITIONER
Payer: MEDICARE

## 2023-10-11 DIAGNOSIS — R39.15 URGENCY OF URINATION: Primary | ICD-10-CM

## 2023-10-11 PROCEDURE — 97530 THERAPEUTIC ACTIVITIES: CPT | Mod: GP

## 2023-10-11 NOTE — OP PT TREATMENT LOG
Pt is a 68 y.o. F with complaint of bowel urgency over the last couple years. Stopped drinking milk and eating less dairy but did not seem to help. In the past, pt was provided with slow and fast PFM contraction exercises that may have somewhat helped her bowel urgency, but then thought that more kegels may have stimulated more bladder problems.     Bladder: Comment:            Urination frequency About 7+ x/day   Urgency:  Bladder urgency may be related to need for a BM    Can have a strong bladder urge daily   Incontinence UUI not daily, a couple drops  Denies ZULLY   Pads 1 liner, maybe 2 with a leak   Nocturia 2x/night   Pain denies   Emptying WNL   Prolapse symptoms  Denies symptoms   Liquid consumption 1 mug coffee  Water WNL   UTI history 2x in last 2 years   Irritants          Bowel: Comment:        Frequency 3x/day   Urge Very strong moments of urgency daily   Incontinence Denies but worried about not making it in time   Emptying denies   [place] stool 4-6   Fiber    Management No red meat  No milk    Likes bread and bagels  2 servings fruits/veggies/day   Pain Denies  Hx of hemorrhoids    1 BM when awakes, 1 after coffee, 1-2 later in the day     OBGYN Comment:        Pregnancies 5   Births 4   Birth type vaginal   Tears/ episiotomies Episiotomy with 1st  Small tearing with others   Surgery denies   Menstruation menopause                   Sexual Activity Comment:    With partner   Type Minimally sexually active currently   Pain Denies pain or complaints with intimacy   Orgasm    Masturbation    Libido              Pain Comment:        denies                    Other Comment:        Physical activity  Hike 1x/wk  Walking more regularly   PLOF    Living environment    Other    Sleep              Systems Review:   Cord questions:  Pins and needles or tingling in both arms and both legs at the same time? denies  Problems with stumbling or falling? Denies     Cauda equina questions:  Problems with bowel and  bladder control? Specifically retention? See above  Pins and needles or numbness in the saddle area? Denies     Review of systems:  General - (chills, night sweats, recent infection, fever, weight loss/gain, unexplained night pain, excessive fatigue): Denies  Do you have a history of cancer? Denies  Gastrointestinal system - (abdominal pain, bowel changes, nausea, vomiting, bloating): Denies  Cardiovascular system - (chest pain, palpitations, orthopnea, other): Denies  Respiratory system - (cough, SOB, sputum production, other): Denies  Musculoskeletal system: osteoporosis, vertebral fracture? Denies  Endocrine - (polyuria, polydipsia, heat or cold intolerance, other): Denies  Neurological - (numbness/tingling, falling/stumbling, HA, dizziness, diplopia, dysphagia/dysarthria, double vision, tinnitus, memory, drop attacks, other): Denies  Any long-term steroid use? Denies      Patient goals: reduce fecal urgency, reduce urinary urgency    OBJECTIVE FINDINGS:      ASSESSMENT   PELVIS/POSTURE            LUMBAR AROM    Flexion    Extension    rotation    Sidebending        HIP ROM    ER    IR    Flexion    Extension    Ankle ROM    Lower Extremity Strength    hip flexion    hip extension    Hip IR    Hip ER    Hip abduction    Knee    Ankle            SPECIAL TESTS    Hamstring length    Hip FADIR    Hip SCOUR    Trendelenburg    Slump test    SLR test    CADE            LUMBAR PALPATION    HIP PALPATION    OTHER PALPATION        SI JOINT TESTING     S/L Compression test    Thigh thrust test    Prone sacral thrust test    Gaenslen test        BALANCE    GAIT MECHANICS    RUNNING MECHANICS    SQUATTING MECHANICS    LUNGING MECHANICS    BED MOBILITY    SIT TO STAND    FLOOR TO STAND      Verbal consent give for internal evaluation and treatment. yes    Pelvic Floor MMT and Function INITIAL EVALUATION 8/2/23 F/U ASSESSMENT 10/2/23 10/11/23   Consent to eval and treat yes yes    Assessment position Hooklying, draped with  "sheet  hooklying    EXTERNAL PFM EXAM No TTP B  Min-mod tension B     PFM OBSERVATION      Skin integrity Atrophy  Minor presence of labia minora  Skin redness  Large skin tag anal opening \"    Contract Present, able to minimize gluteal contraction present    Relax present present    Bulge/pelvic drop present present    Cough absent absent    Perineal descent none none    Vaginal wall laxity Anterior, not to level of hymen Anterior, to level of hymen    Light touch sensation intact intact    INTERNAL Assessment performed Internal vaginal  internal vaginal    Levator Ani PERF        Power 3/5  3/5  Well coordinated with breathing  Mild slow to relax with inhale    Endurance 3 seconds  5    Repetitions 5  6    Quick Flicks * x in 10 sec      Vaginal introitus WNL hypotonicity    Nora-urethra No TTP endopelvic fascia  L>R presence of contract at layer 2 muscles No TTP  L>R presence of contract    Levator palpation Mod tension B  Able to relax with inhale  No TTP B Mod tension  B tenderness present  6-7 o'clock evidence of scarring    Puborectalis       Obturator Internus Neg TTP      Piriformis       TAILBONE            ABDOMEN      MMT strength  Improved TAC and breath coordination  WNL     Diastasis Recti None present   none   Pain None present  No LLQ fullness present  No bladder urgency   None    Mild LLQ fullness  No bladder urgency   Breathing pattern WNL   WNL   Hip flexors No TTP  No TTP   Skin integumentary/incision lines WNL  WNL          PFDI OUTCOME MEASURE 50 total; 8.3 POPDI, 12.5 CRAD, 29.2 SMOOTH 53.1 total; 0 POPDI, 28.1 CRAD, 25 SMOOTH 46.9 total   Adductors       Biofeedback                   TREATMENT LOG:  verbal consent for internal intervention: yes  Diagnosis  Bowel and bladder urgency    Precautions       PT INITIAL EVALUATION:   8/2/23     PT PROGRESS NOTE:        Y/N Treatment Details: Time:   MODALITIES  44355   0 mins   Heat       Ice       THER ACT          52048   50 mins   OUTCOME MEASURES y " PFDI    POC discussion y     Review of symptoms  Tests/Measures y     Falls Screen, Medication Review, VS, pain assessment Y      Education:  h    h    h  h Pelvic floor anatomy/ purpose function    POP precautions: avoid pushing/straining; bend from knees/hips; no holding breath  Hemorrhoid edu  Vaginal atrophy edu: use of vaginal estrogen or coconut oil prn    *Pt verbalized understanding of education and returned demonstration appropriately*    Bladder Education       y  h  y   Healthy bladder norms  Bladder irritants  WNL fluid intake for bladder (pt likely WNL)  GOAL: 2-4 hours b/n bathroom trips  8 sec rule  Bladder urgency suppression strategies review: added toe curls; added side lunges      Bowel Education y  y  y    y      y        y      h Healthy bowel norms  Bowel irritants  Consistent meal times, consistent meal ingredients, night of rest and digest  Fiber: may consider small dose of fiber supplement and assess response over the course of the week. Cont to monitor amount of fiber with food choices  Warm fluid and walking to stimulate BMs in AM    No greater than 15 min on the toilet; add deep breathing to relax  FECAL urge suppression: RAIR reflex, 40 sec hold; deep breathing, mental distraction; cont to practice prn when away from home (grocery store)  - practice RAIR perhaps after breakfast, delay urge for 10-15 min then use BR    Toilet Posture edu/demo/practice h Toilet seat positioning, edu and rationale    Postural edu      HEP / HEP Review  eval: deep breathing  8/11/23: toilet seat positioning; happy baby str, adductor str; 20x5 sec coordination PFMC with exhale  8/31/23: hip abd, hip add, bridge, wall squat, plank at counter, row  9/13/23: bladder urgency suppression strategies  9/20/23: coordination exercises (50%>100, 100>50, 50>3 quick flicks)    THER EX         40160   0 mins   SUPINE THEREX:   h  y  h  y TAC/PFMC in isolation 15x at pace of breath  TAC with ADD, 5 sec hold  TAC with hip  ABD gtb, 5 sec hold  TAC with bridge, hip add with ball    *all with breath coordination    SEATED THEREX:         STANDING THEREX:   n  n  n  h  n  h  h  h  n - shoulder row, gtb, in tandem stance  - wall squat 2 sets to fatigue , with hip add  - counter plank 1 set to fatigue B, with march  - wall plank at elbows 1 set - just breathing    RAIR practice: 40 sec holds   - shoulder mid row   - shoulder low row   - shoulder hADD at bolster   - hip add with ball      STRETCHING:   Y  Cat and cow w/inhale relax and exhale contract           NEURO RE-ED    90055   0 mins   Diaphragmatic breathing y  y Practice in adductor butterfly  Practice in happy baby - grabbing feet    Postural Re-Edu      Pelvic Coordination y  y  y  y Pelvic eval: edu on findings   - pelvic coordination practice:      - inhale and relax practice      - exhale and contract x8          Coordination with EMG Biofeedback n Pelvic floor muscle strengthening with [employer] biofeedback unit, external sensors placed at 9 and 3 o'clock of anal opening, grounding electrode placed on ischial tuberosity:   - SUPINE: HELD     - quick 10x     - slow 10x5 sec *slow to relax initially   - SEATED: YES     - quick 10x     - slow 5x5 sec      - slow 5x10 sec     - 50%>100% x10     - 100%>50% 10x     - 50%>3 quick flicks 10x     - 1x40 sec  - STANDING: YES     - quick 10x     - slow 5x10 sec     - 50%>100% x5     - 100%>50%     - 50%>3 quick flicks 5x     - 1x40 sec      Biofeedback Objective Findings h    n SUPINE REST: 2-3 uV range  SUPINE WORK: 15 uV for 5-10 seconds  SEATED REST: 0 uV   SEATED WORK: 15-20 uV for 10 sec  STANDING REST: 5>3 uV range, reduced quickly  STANDING WORK: 15-20 uV for 10 sec plateau    *40 sec attempt 15>10 uV reduction in plateau, in seated  *slow to relax 50-75% of the time    MANUAL                 89275   0 mins   Joint Mobilization       Deep Tissue mobilizations External      Deep Tissue Mobilization Internal      IASTM/MFR/TrP  Release              PLAN:  D/c

## 2023-10-11 NOTE — PROGRESS NOTES
Physical Therapy Discharge      PT DISCHARGE NOTE FOR OUTPATIENT THERAPY    Patient: Mary Roque MRN: [id number]  : 1955 68 y.o.  Referring Physician: Molly Talbot CRNP  Date of Visit: 10/11/2023      Certification Dates: 23 through 10/31/23    Total Visit Count: 9    Chief Complaints:  Chief Complaint   Patient presents with    Pain    Other     Bowel and bladder function                TODAY'S VISIT:    Time In Session:  Start Time: 907 (pt check-in time)  Stop Time: 957 (d/c)  Time Calculation (min): 50 min   General Information - 10/11/23 0910        Session Details    Document Type discharge evaluation     Mode of Treatment individual therapy        General Information    History of present illness/functional impairment Pt reports 4-5 BMs/day, 3 in AM (1 when awakes and 2 after coffee), stool type 3-4. Asks about benefiber vs metamucil, unsure what choice to make. Conscious of diet and trying to eat healthy. BMs have not been as urgent lately. BLADDER: heading home in the car will cause urgency. A drop or two of leak 1/4 of the time coming home from an errand. Thinks ab contraction helps with bladder urgency. Overall holding at least 2 hours between BR trips.                Daily Falls Screen - 10/11/23 0910        Daily Falls Assessment    Patient reported fall since last visit No                Pain/Vitals - 10/11/23 0910        Pain Assessment    Currently in pain No/Denies                PT - 10/11/23 0910        Physical Therapy    Physical Therapy Specialty Pelvic Floor Program: Age 14+        PT Plan    Frequency of treatment 1 time/week     PT Duration 3 months     PT Cert From 23     PT Cert To 10/31/23     Date PT POC was sent to provider 23     Signed PT Plan of Care received?  Yes                Assessment and Plan - 10/11/23 0910        Assessment    Clinical Assessment Pt has progressed well towards her bladder and bowel function goals. Pt has notable  reduction in bowel urgency and bladder urgency. Pt recognizes continued challenges, and able to verbalize many management strategies that she continues to work on at home. Reviewed healthy bowel norms today and suggested light dose of benefiber to mediate stool quality and frequency. Pt aware of other dietary components to good bowel health. She has progressed very well towards her goals and is ready for d/c to self-management of her symptoms. Pt is welcome to return to PT in future if symptoms do not cont to improve with independent strategies.                       Outcome Measures        8/2/2023    09:06 8/31/2023    13:03 10/11/2023    09:10   PT SUBJECTIVE Outcome Measures   Other PFDI 50 PFDI 53.1 total PFDI 46.9 total       Today's Treatment:    Education provided:  Yes: See treatment log for details of education provided    Pt is a 68 y.o. F with complaint of bowel urgency over the last couple years. Stopped drinking milk and eating less dairy but did not seem to help. In the past, pt was provided with slow and fast PFM contraction exercises that may have somewhat helped her bowel urgency, but then thought that more kegels may have stimulated more bladder problems.     Bladder: Comment:            Urination frequency About 7+ x/day   Urgency:  Bladder urgency may be related to need for a BM    Can have a strong bladder urge daily   Incontinence UUI not daily, a couple drops  Denies ZULLY   Pads 1 liner, maybe 2 with a leak   Nocturia 2x/night   Pain denies   Emptying WNL   Prolapse symptoms  Denies symptoms   Liquid consumption 1 mug coffee  Water WNL   UTI history 2x in last 2 years   Irritants          Bowel: Comment:        Frequency 3x/day   Urge Very strong moments of urgency daily   Incontinence Denies but worried about not making it in time   Emptying denies   [place] stool 4-6   Fiber    Management No red meat  No milk    Likes bread and bagels  2 servings fruits/veggies/day   Pain Denies  Hx of  hemorrhoids    1 BM when awakes, 1 after coffee, 1-2 later in the day     OBGYN Comment:        Pregnancies 5   Births 4   Birth type vaginal   Tears/ episiotomies Episiotomy with 1st  Small tearing with others   Surgery denies   Menstruation menopause                   Sexual Activity Comment:    With partner   Type Minimally sexually active currently   Pain Denies pain or complaints with intimacy   Orgasm    Masturbation    Libido              Pain Comment:        denies                    Other Comment:        Physical activity  Hike 1x/wk  Walking more regularly   PLOF    Living environment    Other    Sleep              Systems Review:   Cord questions:  Pins and needles or tingling in both arms and both legs at the same time? denies  Problems with stumbling or falling? Denies     Cauda equina questions:  Problems with bowel and bladder control? Specifically retention? See above  Pins and needles or numbness in the saddle area? Denies     Review of systems:  General - (chills, night sweats, recent infection, fever, weight loss/gain, unexplained night pain, excessive fatigue): Denies  Do you have a history of cancer? Denies  Gastrointestinal system - (abdominal pain, bowel changes, nausea, vomiting, bloating): Denies  Cardiovascular system - (chest pain, palpitations, orthopnea, other): Denies  Respiratory system - (cough, SOB, sputum production, other): Denies  Musculoskeletal system: osteoporosis, vertebral fracture? Denies  Endocrine - (polyuria, polydipsia, heat or cold intolerance, other): Denies  Neurological - (numbness/tingling, falling/stumbling, HA, dizziness, diplopia, dysphagia/dysarthria, double vision, tinnitus, memory, drop attacks, other): Denies  Any long-term steroid use? Denies      Patient goals: reduce fecal urgency, reduce urinary urgency    OBJECTIVE FINDINGS:      ASSESSMENT   PELVIS/POSTURE            LUMBAR AROM    Flexion    Extension    rotation    Sidebending        HIP ROM    ER   "  IR    Flexion    Extension    Ankle ROM    Lower Extremity Strength    hip flexion    hip extension    Hip IR    Hip ER    Hip abduction    Knee    Ankle            SPECIAL TESTS    Hamstring length    Hip FADIR    Hip SCOUR    Trendelenburg    Slump test    SLR test    CADE            LUMBAR PALPATION    HIP PALPATION    OTHER PALPATION        SI JOINT TESTING     S/L Compression test    Thigh thrust test    Prone sacral thrust test    Gaenslen test        BALANCE    GAIT MECHANICS    RUNNING MECHANICS    SQUATTING MECHANICS    LUNGING MECHANICS    BED MOBILITY    SIT TO STAND    FLOOR TO STAND      Verbal consent give for internal evaluation and treatment. yes    Pelvic Floor MMT and Function INITIAL EVALUATION 8/2/23 F/U ASSESSMENT 10/2/23 10/11/23   Consent to eval and treat yes yes    Assessment position Hooklying, draped with sheet  hooklying    EXTERNAL PFM EXAM No TTP B  Min-mod tension B     PFM OBSERVATION      Skin integrity Atrophy  Minor presence of labia minora  Skin redness  Large skin tag anal opening \"    Contract Present, able to minimize gluteal contraction present    Relax present present    Bulge/pelvic drop present present    Cough absent absent    Perineal descent none none    Vaginal wall laxity Anterior, not to level of hymen Anterior, to level of hymen    Light touch sensation intact intact    INTERNAL Assessment performed Internal vaginal  internal vaginal    Levator Ani PERF        Power 3/5  3/5  Well coordinated with breathing  Mild slow to relax with inhale    Endurance 3 seconds  5    Repetitions 5  6    Quick Flicks * x in 10 sec      Vaginal introitus WNL hypotonicity    Nora-urethra No TTP endopelvic fascia  L>R presence of contract at layer 2 muscles No TTP  L>R presence of contract    Levator palpation Mod tension B  Able to relax with inhale  No TTP B Mod tension  B tenderness present  6-7 o'clock evidence of scarring    Puborectalis       Obturator Internus Neg TTP    "   Piriformis       TAILBONE            ABDOMEN      MMT strength  Improved TAC and breath coordination  WNL     Diastasis Recti None present   none   Pain None present  No LLQ fullness present  No bladder urgency   None    Mild LLQ fullness  No bladder urgency   Breathing pattern WNL   WNL   Hip flexors No TTP  No TTP   Skin integumentary/incision lines WNL  WNL          PFDI OUTCOME MEASURE 50 total; 8.3 POPDI, 12.5 CRAD, 29.2 SMOOTH 53.1 total; 0 POPDI, 28.1 CRAD, 25 SMOOTH 46.9 total   Adductors       Biofeedback                   TREATMENT LOG:  verbal consent for internal intervention: yes  Diagnosis  Bowel and bladder urgency    Precautions       PT INITIAL EVALUATION:   8/2/23     PT PROGRESS NOTE:        Y/N Treatment Details: Time:   MODALITIES  57636   0 mins   Heat       Ice       THER ACT          27009   50 mins   OUTCOME MEASURES y PFDI    POC discussion y     Review of symptoms  Tests/Measures y     Falls Screen, Medication Review, VS, pain assessment Y      Education:  h    h    h  h Pelvic floor anatomy/ purpose function    POP precautions: avoid pushing/straining; bend from knees/hips; no holding breath  Hemorrhoid edu  Vaginal atrophy edu: use of vaginal estrogen or coconut oil prn    *Pt verbalized understanding of education and returned demonstration appropriately*    Bladder Education       y  h  y   Healthy bladder norms  Bladder irritants  WNL fluid intake for bladder (pt likely WNL)  GOAL: 2-4 hours b/n bathroom trips  8 sec rule  Bladder urgency suppression strategies review: added toe curls; added side lunges      Bowel Education y  y  y    y      y        y      h Healthy bowel norms  Bowel irritants  Consistent meal times, consistent meal ingredients, night of rest and digest  Fiber: may consider small dose of fiber supplement and assess response over the course of the week. Cont to monitor amount of fiber with food choices  Warm fluid and walking to stimulate BMs in AM    No greater than 15  min on the toilet; add deep breathing to relax  FECAL urge suppression: RAIR reflex, 40 sec hold; deep breathing, mental distraction; cont to practice prn when away from home (grocery store)  - practice RAIR perhaps after breakfast, delay urge for 10-15 min then use BR    Toilet Posture edu/demo/practice h Toilet seat positioning, edu and rationale    Postural edu      HEP / HEP Review  eval: deep breathing  8/11/23: toilet seat positioning; happy baby str, adductor str; 20x5 sec coordination PFMC with exhale  8/31/23: hip abd, hip add, bridge, wall squat, plank at counter, row  9/13/23: bladder urgency suppression strategies  9/20/23: coordination exercises (50%>100, 100>50, 50>3 quick flicks)    THER EX         78351   0 mins   SUPINE THEREX:   h  y  h  y TAC/PFMC in isolation 15x at pace of breath  TAC with ADD, 5 sec hold  TAC with hip ABD gtb, 5 sec hold  TAC with bridge, hip add with ball    *all with breath coordination    SEATED THEREX:         STANDING THEREX:   n  n  n  h  n  h  h  h  n - shoulder row, gtb, in tandem stance  - wall squat 2 sets to fatigue , with hip add  - counter plank 1 set to fatigue B, with march  - wall plank at elbows 1 set - just breathing    RAIR practice: 40 sec holds   - shoulder mid row   - shoulder low row   - shoulder hADD at bolster   - hip add with ball      STRETCHING:   Y  Cat and cow w/inhale relax and exhale contract           NEURO RE-ED    05862   0 mins   Diaphragmatic breathing y  y Practice in adductor butterfly  Practice in happy baby - grabbing feet    Postural Re-Edu      Pelvic Coordination y  y  y  y Pelvic eval: edu on findings   - pelvic coordination practice:      - inhale and relax practice      - exhale and contract x8          Coordination with EMG Biofeedback n Pelvic floor muscle strengthening with [employer] biofeedback unit, external sensors placed at 9 and 3 o'clock of anal opening, grounding electrode placed on ischial tuberosity:   - SUPINE: HELD      - quick 10x     - slow 10x5 sec *slow to relax initially   - SEATED: YES     - quick 10x     - slow 5x5 sec      - slow 5x10 sec     - 50%>100% x10     - 100%>50% 10x     - 50%>3 quick flicks 10x     - 1x40 sec  - STANDING: YES     - quick 10x     - slow 5x10 sec     - 50%>100% x5     - 100%>50%     - 50%>3 quick flicks 5x     - 1x40 sec      Biofeedback Objective Findings h    n SUPINE REST: 2-3 uV range  SUPINE WORK: 15 uV for 5-10 seconds  SEATED REST: 0 uV   SEATED WORK: 15-20 uV for 10 sec  STANDING REST: 5>3 uV range, reduced quickly  STANDING WORK: 15-20 uV for 10 sec plateau    *40 sec attempt 15>10 uV reduction in plateau, in seated  *slow to relax 50-75% of the time    MANUAL                 09241   0 mins   Joint Mobilization       Deep Tissue mobilizations External      Deep Tissue Mobilization Internal      IASTM/MFR/TrP Release              PLAN:  D/c               Goals Addressed                 This Visit's Progress     COMPLETED: Mutually agreed upon pain goal   On track     Mutually agreed upon pain goal: n/a    PATIENT STATED: REDUCE BOWEL AND BLADDER URGENCY       COMPLETED: Pelvic PT Goals   On track     Pt will be I with initial HEP instruction to improve pt's bowel function and PFM tension: 3 wks MET    Pt will be I with x3 complete bowel emptying behavioral strategies for improving bowel health: 4 wks  MET    Pt will successfully demonstrate fecal urgency suppression strategies for 10 min, 90% of the time, for improved bowel health: 8 wks  MET    Pt will be I with diaphragmatic breathing for reducing tension, stress, pelvic pain complaints: 4 wks MET    Pt will demonstrate good coordination (contract, relax, drop) of PFM, evident per eval, for improving coordinated defecation: 12 wks MET    Pt will report no UUI complaints greater than 1x every 2 wks, for good bowel health: 12 wks  IMPROVING    Pt will report bladder freq WNL, day and night: 12 wks MET    Pt will be I with progression of  PFM HEP instruction to maintain/improve bowel and pelvic health: 12 wks MET    PFDI score will improve to below 35, indicating improvements in bladder/bowel function and QOL (50 on IE): 12 wks IMPROVED